# Patient Record
Sex: FEMALE | Race: WHITE
[De-identification: names, ages, dates, MRNs, and addresses within clinical notes are randomized per-mention and may not be internally consistent; named-entity substitution may affect disease eponyms.]

---

## 2019-06-27 ENCOUNTER — HOSPITAL ENCOUNTER (OUTPATIENT)
Dept: HOSPITAL 35 - HYPER | Age: 74
End: 2019-06-27
Attending: PREVENTIVE MEDICINE
Payer: COMMERCIAL

## 2019-06-27 DIAGNOSIS — I96: ICD-10-CM

## 2019-06-27 DIAGNOSIS — N18.6: ICD-10-CM

## 2019-06-27 DIAGNOSIS — L97.512: ICD-10-CM

## 2019-06-27 DIAGNOSIS — Z87.891: ICD-10-CM

## 2019-06-27 DIAGNOSIS — E11.52: ICD-10-CM
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
## 2019-06-27 DIAGNOSIS — K50.90: ICD-10-CM

## 2019-06-27 DIAGNOSIS — T87.89: Primary | ICD-10-CM

## 2019-06-27 DIAGNOSIS — Z99.2: ICD-10-CM

## 2019-06-27 DIAGNOSIS — I48.2: ICD-10-CM

## 2019-06-27 DIAGNOSIS — M86.8X8: ICD-10-CM

## 2019-06-27 DIAGNOSIS — E11.22: ICD-10-CM

## 2019-06-27 DIAGNOSIS — Z79.82: ICD-10-CM

## 2019-06-27 DIAGNOSIS — E11.69: ICD-10-CM

## 2019-06-27 DIAGNOSIS — E11.621: ICD-10-CM

## 2019-07-11 ENCOUNTER — HOSPITAL ENCOUNTER (OUTPATIENT)
Dept: HOSPITAL 35 - HYPER | Age: 74
End: 2019-07-11
Attending: PREVENTIVE MEDICINE
Payer: COMMERCIAL

## 2019-07-11 DIAGNOSIS — I48.2: ICD-10-CM
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
## 2019-07-11 DIAGNOSIS — T87.89: Primary | ICD-10-CM

## 2019-07-11 DIAGNOSIS — I12.9: ICD-10-CM

## 2019-07-11 DIAGNOSIS — I96: ICD-10-CM

## 2019-07-11 DIAGNOSIS — E11.69: ICD-10-CM

## 2019-07-11 DIAGNOSIS — E11.22: ICD-10-CM

## 2019-07-11 DIAGNOSIS — M86.8X7: ICD-10-CM

## 2019-07-11 DIAGNOSIS — N18.6: ICD-10-CM

## 2019-07-11 DIAGNOSIS — E11.52: ICD-10-CM

## 2019-07-11 DIAGNOSIS — Z87.891: ICD-10-CM

## 2019-07-11 DIAGNOSIS — Z79.82: ICD-10-CM

## 2019-07-11 DIAGNOSIS — Z99.2: ICD-10-CM

## 2019-07-11 DIAGNOSIS — E11.621: ICD-10-CM

## 2019-07-11 DIAGNOSIS — L97.512: ICD-10-CM

## 2019-07-25 ENCOUNTER — HOSPITAL ENCOUNTER (OUTPATIENT)
Dept: HOSPITAL 35 - HYPER | Age: 74
End: 2019-07-25
Attending: PREVENTIVE MEDICINE
Payer: COMMERCIAL

## 2019-07-25 DIAGNOSIS — Z95.820: ICD-10-CM
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
## 2019-07-25 DIAGNOSIS — Z87.891: ICD-10-CM

## 2019-07-25 DIAGNOSIS — T87.89: Primary | ICD-10-CM

## 2019-07-25 DIAGNOSIS — E11.69: ICD-10-CM

## 2019-07-25 DIAGNOSIS — E11.621: ICD-10-CM

## 2019-07-25 DIAGNOSIS — I96: ICD-10-CM

## 2019-07-25 DIAGNOSIS — N18.6: ICD-10-CM

## 2019-07-25 DIAGNOSIS — Z79.82: ICD-10-CM

## 2019-07-25 DIAGNOSIS — M86.8X8: ICD-10-CM

## 2019-07-25 DIAGNOSIS — I48.2: ICD-10-CM

## 2019-07-25 DIAGNOSIS — L97.512: ICD-10-CM

## 2019-07-25 DIAGNOSIS — E11.52: ICD-10-CM

## 2019-07-25 DIAGNOSIS — K50.90: ICD-10-CM

## 2019-07-25 DIAGNOSIS — E11.22: ICD-10-CM

## 2019-07-25 DIAGNOSIS — Z89.522: ICD-10-CM

## 2019-07-25 DIAGNOSIS — Z99.2: ICD-10-CM

## 2019-08-13 ENCOUNTER — HOSPITAL ENCOUNTER (OUTPATIENT)
Dept: HOSPITAL 35 - HYPER | Age: 74
End: 2019-08-13
Attending: NURSE PRACTITIONER
Payer: COMMERCIAL

## 2019-08-13 DIAGNOSIS — K50.90: ICD-10-CM

## 2019-08-13 DIAGNOSIS — M86.8X8: ICD-10-CM

## 2019-08-13 DIAGNOSIS — T87.89: Primary | ICD-10-CM
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
## 2019-08-13 DIAGNOSIS — Z79.82: ICD-10-CM

## 2019-08-13 DIAGNOSIS — Z87.891: ICD-10-CM

## 2019-08-13 DIAGNOSIS — E11.69: ICD-10-CM

## 2019-08-13 DIAGNOSIS — I48.2: ICD-10-CM

## 2019-08-13 DIAGNOSIS — Z99.2: ICD-10-CM

## 2019-08-13 DIAGNOSIS — Z89.411: ICD-10-CM

## 2019-08-13 DIAGNOSIS — I96: ICD-10-CM

## 2019-08-13 DIAGNOSIS — L97.512: ICD-10-CM

## 2019-08-13 DIAGNOSIS — Z95.820: ICD-10-CM

## 2019-08-13 DIAGNOSIS — E11.52: ICD-10-CM

## 2019-08-13 DIAGNOSIS — N18.6: ICD-10-CM

## 2019-08-13 DIAGNOSIS — E11.22: ICD-10-CM

## 2019-08-13 DIAGNOSIS — Z89.522: ICD-10-CM

## 2019-08-20 ENCOUNTER — HOSPITAL ENCOUNTER (OUTPATIENT)
Dept: HOSPITAL 35 - HYPER | Age: 74
End: 2019-08-20
Attending: PREVENTIVE MEDICINE
Payer: COMMERCIAL

## 2019-08-20 DIAGNOSIS — N18.6: ICD-10-CM

## 2019-08-20 DIAGNOSIS — L97.512: ICD-10-CM

## 2019-08-20 DIAGNOSIS — Z87.891: ICD-10-CM

## 2019-08-20 DIAGNOSIS — M86.8X7: ICD-10-CM
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
## 2019-08-20 DIAGNOSIS — Z79.82: ICD-10-CM

## 2019-08-20 DIAGNOSIS — E11.621: ICD-10-CM

## 2019-08-20 DIAGNOSIS — Z99.2: ICD-10-CM

## 2019-08-20 DIAGNOSIS — E11.52: ICD-10-CM

## 2019-08-20 DIAGNOSIS — T81.89XD: Primary | ICD-10-CM

## 2019-08-20 DIAGNOSIS — I96: ICD-10-CM

## 2019-08-20 DIAGNOSIS — E11.22: ICD-10-CM

## 2019-08-20 DIAGNOSIS — I48.2: ICD-10-CM

## 2019-08-20 DIAGNOSIS — E11.69: ICD-10-CM

## 2019-09-03 ENCOUNTER — HOSPITAL ENCOUNTER (INPATIENT)
Dept: HOSPITAL 35 - HYPER | Age: 74
LOS: 10 days | Discharge: NURSING, REHAB OR LONG-TERM CARE | DRG: 853 | End: 2019-09-13
Attending: INTERNAL MEDICINE | Admitting: HOSPITALIST
Payer: COMMERCIAL

## 2019-09-03 VITALS — DIASTOLIC BLOOD PRESSURE: 50 MMHG | SYSTOLIC BLOOD PRESSURE: 95 MMHG

## 2019-09-03 VITALS — DIASTOLIC BLOOD PRESSURE: 48 MMHG | SYSTOLIC BLOOD PRESSURE: 86 MMHG

## 2019-09-03 VITALS — WEIGHT: 108.9 LBS | BODY MASS INDEX: 20.04 KG/M2 | HEIGHT: 62 IN

## 2019-09-03 DIAGNOSIS — Z80.42: ICD-10-CM

## 2019-09-03 DIAGNOSIS — M86.8X8: ICD-10-CM

## 2019-09-03 DIAGNOSIS — Z66: ICD-10-CM

## 2019-09-03 DIAGNOSIS — A41.9: Primary | ICD-10-CM

## 2019-09-03 DIAGNOSIS — L02.611: ICD-10-CM

## 2019-09-03 DIAGNOSIS — R65.21: ICD-10-CM

## 2019-09-03 DIAGNOSIS — Z90.49: ICD-10-CM

## 2019-09-03 DIAGNOSIS — I73.9: ICD-10-CM

## 2019-09-03 DIAGNOSIS — N18.6: ICD-10-CM

## 2019-09-03 DIAGNOSIS — Z93.3: ICD-10-CM

## 2019-09-03 DIAGNOSIS — I95.9: ICD-10-CM

## 2019-09-03 DIAGNOSIS — Z77.22: ICD-10-CM

## 2019-09-03 DIAGNOSIS — L03.115: ICD-10-CM

## 2019-09-03 DIAGNOSIS — K50.90: ICD-10-CM

## 2019-09-03 DIAGNOSIS — Z79.01: ICD-10-CM

## 2019-09-03 DIAGNOSIS — I48.91: ICD-10-CM

## 2019-09-03 DIAGNOSIS — Z87.891: ICD-10-CM

## 2019-09-03 DIAGNOSIS — E44.0: ICD-10-CM

## 2019-09-03 DIAGNOSIS — Z79.1: ICD-10-CM

## 2019-09-03 DIAGNOSIS — D63.1: ICD-10-CM

## 2019-09-03 DIAGNOSIS — Z89.512: ICD-10-CM

## 2019-09-03 DIAGNOSIS — R00.0: ICD-10-CM

## 2019-09-03 DIAGNOSIS — Z88.6: ICD-10-CM

## 2019-09-03 LAB
ALBUMIN SERPL-MCNC: 2.5 G/DL (ref 3.4–5)
ALT SERPL-CCNC: 19 U/L (ref 30–65)
ANION GAP SERPL CALC-SCNC: 12 MMOL/L (ref 7–16)
AST SERPL-CCNC: 26 U/L (ref 15–37)
BASOPHILS NFR BLD AUTO: 0.6 % (ref 0–2)
BILIRUB SERPL-MCNC: 0.3 MG/DL
BUN SERPL-MCNC: 42 MG/DL (ref 7–18)
CALCIUM SERPL-MCNC: 10.3 MG/DL (ref 8.5–10.1)
CHLORIDE SERPL-SCNC: 100 MMOL/L (ref 98–107)
CO2 SERPL-SCNC: 26 MMOL/L (ref 21–32)
CREAT SERPL-MCNC: 5.1 MG/DL (ref 0.6–1)
EOSINOPHIL NFR BLD: 1.2 % (ref 0–3)
ERYTHROCYTE [DISTWIDTH] IN BLOOD BY AUTOMATED COUNT: 19.5 % (ref 10.5–14.5)
GLUCOSE SERPL-MCNC: 170 MG/DL (ref 74–106)
GRANULOCYTES NFR BLD MANUAL: 76.6 % (ref 36–66)
HCT VFR BLD CALC: 31 % (ref 37–47)
HGB BLD-MCNC: 9.7 GM/DL (ref 12–15)
LYMPHOCYTES NFR BLD AUTO: 13.2 % (ref 24–44)
MCH RBC QN AUTO: 25 PG (ref 26–34)
MCHC RBC AUTO-ENTMCNC: 31.3 G/DL (ref 28–37)
MCV RBC: 79.9 FL (ref 80–100)
MONOCYTES NFR BLD: 8.4 % (ref 1–8)
NEUTROPHILS # BLD: 8.6 THOU/UL (ref 1.4–8.2)
PLATELET # BLD: 351 THOU/UL (ref 150–400)
POTASSIUM SERPL-SCNC: 3.7 MMOL/L (ref 3.5–5.1)
PROT SERPL-MCNC: 7.4 G/DL (ref 6.4–8.2)
RBC # BLD AUTO: 3.88 MIL/UL (ref 4.2–5)
SODIUM SERPL-SCNC: 138 MMOL/L (ref 136–145)
WBC # BLD AUTO: 11.3 THOU/UL (ref 4–11)

## 2019-09-03 PROCEDURE — 10047: CPT

## 2019-09-03 PROCEDURE — 10081 I&D PILONIDAL CYST COMP: CPT

## 2019-09-03 PROCEDURE — 62110: CPT

## 2019-09-03 PROCEDURE — 62900: CPT

## 2019-09-03 PROCEDURE — 50101: CPT

## 2019-09-03 PROCEDURE — 57178: CPT

## 2019-09-03 PROCEDURE — 50010 RENAL EXPLORATION: CPT

## 2019-09-03 PROCEDURE — 10078: CPT

## 2019-09-03 PROCEDURE — 50951 ENDOSCOPY OF URETER: CPT

## 2019-09-03 PROCEDURE — 32100 EXPLORATION OF CHEST: CPT

## 2019-09-03 PROCEDURE — 50386 REMOVE STENT VIA TRANSURETH: CPT

## 2019-09-03 PROCEDURE — 70005: CPT

## 2019-09-03 PROCEDURE — 57091: CPT

## 2019-09-03 PROCEDURE — 56527: CPT

## 2019-09-03 PROCEDURE — 10204: CPT

## 2019-09-03 PROCEDURE — 10203: CPT

## 2019-09-03 NOTE — NUR
PATIENT ALERT AND ORIENTED AND DIRECT ADMIT WITH RIGHT LE CELLULITIS FROM DR. PACHECO OFFICE. EXPERIENCING 10/10 AND CRYING FORM RIGHT LE. , DELON
AT BEDSIDE UPON ADMISSION AND LIVES AT HOME WITH . USES W/C FROM
TRANSPORTATION. LEFT BKA 2 YEARS AGO. DIALYSIS MWF AND RIGHT CHEST DIALYSIS
CATHETER. REGULAR DIET.

## 2019-09-04 VITALS — DIASTOLIC BLOOD PRESSURE: 53 MMHG | SYSTOLIC BLOOD PRESSURE: 91 MMHG

## 2019-09-04 VITALS — DIASTOLIC BLOOD PRESSURE: 62 MMHG | SYSTOLIC BLOOD PRESSURE: 106 MMHG

## 2019-09-04 VITALS — SYSTOLIC BLOOD PRESSURE: 103 MMHG | DIASTOLIC BLOOD PRESSURE: 50 MMHG

## 2019-09-04 VITALS — SYSTOLIC BLOOD PRESSURE: 107 MMHG | DIASTOLIC BLOOD PRESSURE: 53 MMHG

## 2019-09-04 VITALS — DIASTOLIC BLOOD PRESSURE: 46 MMHG | SYSTOLIC BLOOD PRESSURE: 95 MMHG

## 2019-09-04 LAB
ABSOLUTE RETIC COUNT: 0.11 10^6/UL
ALBUMIN SERPL-MCNC: 2.2 G/DL (ref 3.4–5)
ANION GAP SERPL CALC-SCNC: 15 MMOL/L (ref 7–16)
ANISOCYTOSIS BLD QL SMEAR: (no result)
BASOPHILS NFR BLD AUTO: 0 % (ref 0–2)
BUN SERPL-MCNC: 44 MG/DL (ref 7–18)
CALCIUM SERPL-MCNC: 9.6 MG/DL (ref 8.5–10.1)
CHLORIDE SERPL-SCNC: 101 MMOL/L (ref 98–107)
CO2 SERPL-SCNC: 20 MMOL/L (ref 21–32)
CREAT SERPL-MCNC: 5.6 MG/DL (ref 0.6–1)
EOSINOPHIL NFR BLD: 2 % (ref 0–3)
ERYTHROCYTE [DISTWIDTH] IN BLOOD BY AUTOMATED COUNT: 19.3 % (ref 10.5–14.5)
FERRITIN SERPL-MCNC: 1330 NG/ML (ref 8–252)
GLUCOSE SERPL-MCNC: 93 MG/DL (ref 74–106)
GRANULOCYTES NFR BLD MANUAL: 76 % (ref 36–66)
HCT VFR BLD CALC: 28.1 % (ref 37–47)
HGB BLD-MCNC: 8.9 GM/DL (ref 12–15)
INR PPP: 1.1
IRON SERPL-MCNC: 30 UG/DL (ref 50–170)
LYMPHOCYTES NFR BLD AUTO: 8 % (ref 24–44)
MCH RBC QN AUTO: 25.4 PG (ref 26–34)
MCHC RBC AUTO-ENTMCNC: 31.7 G/DL (ref 28–37)
MCV RBC: 80 FL (ref 80–100)
MONOCYTES NFR BLD: 13 % (ref 1–8)
NEUTROPHILS # BLD: 7.9 THOU/UL (ref 1.4–8.2)
NEUTS BAND NFR BLD: 1 % (ref 0–8)
OBSERVED RETIC COUNT: 2.6 % (ref 0.6–2.6)
OVALOCYTES BLD QL SMEAR: (no result)
PHOSPHATE SERPL-MCNC: 6.2 MG/DL (ref 2.5–4.9)
PLATELET # BLD: 294 THOU/UL (ref 150–400)
POLYCHROMASIA BLD QL SMEAR: (no result)
POTASSIUM SERPL-SCNC: 4 MMOL/L (ref 3.5–5.1)
PROTHROMBIN TIME: 11 SECONDS (ref 9.3–11.4)
RBC # BLD AUTO: 3.52 MIL/UL (ref 4.2–5)
SAO2 % BLD FROM PO2: 23 % (ref 20–39)
SODIUM SERPL-SCNC: 136 MMOL/L (ref 136–145)
TEARDROPS: (no result)
TIBC SERPL-MCNC: 129 UG/DL (ref 250–450)
VIT B12 SERPL-MCNC: 4053 PG/ML (ref 193–986)
WBC # BLD AUTO: 10.2 THOU/UL (ref 4–11)

## 2019-09-04 PROCEDURE — 5A1D70Z PERFORMANCE OF URINARY FILTRATION, INTERMITTENT, LESS THAN 6 HOURS PER DAY: ICD-10-PCS | Performed by: HOSPITALIST

## 2019-09-04 NOTE — EKG
[address]  [address]
Phone:  [phone]                    ELECTROCARDIOGRAM REPORT      
_______________________________________________________________________________
 
Name:       GAVINO NGUYEN                   Room #:         455-P       ADM IN  
M.R.#:      [id number]     Account #:      [id number]  
Admission:  19    Attend Phys:    Ambrose Cha MD      
Discharge:              Date of Birth:  [birth year]  
                                                          Report #: [id number]
   [id number]
_______________________________________________________________________________
THIS REPORT FOR:   //name//                          
 
                          [facility]
                                       
Test Date:    2019               Test Time:    08:49:21
Pat Name:     GAVINO NGUYEN              Department:   
Patient ID:   [id number]            Room:         455 
Gender:       F                        Technician:   SHERYL
:          1945               Requested By: Leatha Ireland
Order Number: [id number] MD:   Polo Montez
                                 Measurements
Intervals                              Axis          
Rate:         105                      P:            15
AK:           168                      QRS:          34
QRSD:         88                       T:            13
QT:           347                                    
QTc:          459                                    
                           Interpretive Statements
Sinus tachycardia
Low voltage, precordial leads
Borderline T abnormalities, inferior leads
No previous ECG available for comparison
 
Electronically Signed On 2019 13:20:45 CDT by Polo Montez
[email or web address]
 
 
 
 
 
 
 
 
 
 
 
 
 
 
 
 
 
 
  <ELECTRONICALLY SIGNED>
   By: Polo Montez MD        
  19     1320
D: 19 0849                           _____________________________________
T: 1951                           Polo Montez MD          /BRYAN

## 2019-09-04 NOTE — NUR
Patient has been complaining of "level ten" unrelieved right foot pain.
Received new orders from Dr Castellanos for Morphine 2mg/0.5mL IV push. Patient has
expressed relief from this new order. Dialysis to be done this evening, as
ordered. Patient has had a moderate amount of brown vaginal discharge. Blood
pressure continues to run low which is "normal" for this patient. LSCTA
(diminished), abdomen is round, BS active, no new open areas on skin. Will
continue to monitor.

## 2019-09-04 NOTE — NUR
ASSUMED CARE OF PT AT 1900HRS. PT AOX4 AND CALLS FOR ASSISTANCE AS NEEDED.
PT IS ON DIALYSIS M, W & F. ACCESS ON RIGHT CHEST. LEFT BKA COMPLETELY HEALED.
COLOSTOMY BAG ON LEFT QUADRANT. PT COMPLAINED OF PAIN ENTIRE SHIFT WITH
MINIMAL RELIEF FROM PRN PAIN MEDS. VSS AND WILL CONTINUE TO MONITOR.

## 2019-09-05 VITALS — DIASTOLIC BLOOD PRESSURE: 53 MMHG | SYSTOLIC BLOOD PRESSURE: 93 MMHG

## 2019-09-05 VITALS — DIASTOLIC BLOOD PRESSURE: 52 MMHG | SYSTOLIC BLOOD PRESSURE: 101 MMHG

## 2019-09-05 VITALS — SYSTOLIC BLOOD PRESSURE: 136 MMHG | DIASTOLIC BLOOD PRESSURE: 56 MMHG

## 2019-09-05 VITALS — SYSTOLIC BLOOD PRESSURE: 116 MMHG | DIASTOLIC BLOOD PRESSURE: 46 MMHG

## 2019-09-05 PROCEDURE — B4181ZZ FLUOROSCOPY OF BILATERAL RENAL ARTERIES USING LOW OSMOLAR CONTRAST: ICD-10-PCS | Performed by: RADIOLOGY

## 2019-09-05 PROCEDURE — B41D1ZZ FLUOROSCOPY OF AORTA AND BILATERAL LOWER EXTREMITY ARTERIES USING LOW OSMOLAR CONTRAST: ICD-10-PCS | Performed by: RADIOLOGY

## 2019-09-05 NOTE — NUR
ASSUMED CARE OF PATIENT AT 0715, PATIENT ALERT AND ORIENTED X 4. PATIENT
BEDREST. PATIENT HAS RIGHT FOOT WOUND, DRESSING C/D/I. PATIENT C/O PAIN WITH
RIGHT FOOT, RECEIVED MORPHINE 2MG IV AND OXYCODONE 1 TABLET WITH PARTIAL
RELIEF. PATIENT HAD LIGHT BREAKFAST, AND NPO FOR LUNCH FOR ARTERIOGRAM THIS
AFTERNOON, PATIENT LEFT FLOOR AT 1500, VIA BED. PATIENT ARRIVED BACK ON THE
UNIT AT ABOUT 1830, REPORT FROM JOSE/RN. PATIENT ON BEDREST UNTIL 2140.
PATIENT HAS DRESSING TO LEFT GROIN, NO OCCULSION FOUND. PATIENT CONTINUES WITH
DISCHARGE FROM VAGINAL AREA, BROWNISH COLOR WITH FOUL ODOR, THIS RN NOTIFIED
DR SORIA, RECEIVED ORDER FOR LOTRIMIN CREAM BID VAGINAL, ORDER FOR PELVIC
ULTRASOUND WILL DONE IN AM. WHEN PATIENT ARRIVED BACK ON THE UNIT, NO C/O
PAIN, SHE RECEIVED FENTANYL 25 MCG IV IN PROCEDURE. WILL CONTINUE TO MONITOR.

## 2019-09-05 NOTE — NUR
ASSUMED CARE AROUND 1900. AXOX3. MED REC COMPLETED. PT DID NOT HAVE A LIST.
LIST PROBABLY NOT COMPLETE. DONE AS MUCH AS PT REMEMBERS. DIALYSIS COMPLETED.
BP NOTED LOW. CALLED NP GLENDA ON CALL FOR HIMS. NS STOPPED AND MIDODRINE
RESTARTED. NO S/S ACUTE DISTRESS NOTED OR REPORTED AT THIS TIME. WILL CONT TO
MONITOR ANY CHANGES IN CONDITION.

## 2019-09-05 NOTE — HC
[facility]
[address]
[place]   [address]                     CONSULTATION                  
_______________________________________________________________________________
 
Name:       GAVINO NGUYEN                   Room #:         455-P       ADM IN  
M.R.#:      [id number]                       Account #:      [id number]  
Admission:  09/03/19    Attend Phys:    Ambrose Cha MD      
Discharge:              Date of Birth:  [birth year]  
                                                          Report #: [id number]
                                                                    [id number]   
_______________________________________________________________________________
THIS REPORT FOR:   //name//                          
 
CC: James Read
 
DATE OF SERVICE:  09/04/2019
 
 
CHIEF COMPLAINT:  Ulceration.
 
HISTORY OF PRESENT ILLNESS:  This is a 74-year-old female patient with a history
of end-stage renal disease and peripheral vascular disease and prior left below
knee amputation, who was admitted from the wound care clinic yesterday with
increasing pain and redness involving her right foot, in particular the right
first MTP or first metatarsal.  She has had a prior amputation of the right toe.
 The patient notes continued and worsening pain and is now admitted for further
evaluation and treatment.
 
PAST MEDICAL HISTORY:  Positive for history of end-stage renal disease,
requiring hemodialysis, history of Crohn's disease, peripheral vascular disease,
status post previous vascular bypass.
 
ALLERGIES:  CODEINE.
 
SOCIAL HISTORY:  The patient smoked 1 pack per day, having quit 5 years ago.  No
alcohol intake.
 
FAMILY HISTORY:  Positive for prostate cancer in her father.
 
MEDICATIONS:  Fludrocortisone, morphine, oxycodone, polyethylene glycol, and
vancomycin.
 
ALLERGIES:  CODEINE.
 
REVIEW OF SYSTEMS:
CONSTITUTIONAL:  The patient denies fever, chills, or weight loss.
NEUROLOGICAL:  The patient denies focal weakness.
ENT:  The patient denies earache, nasal drainage, sore throat.
CARDIOVASCULAR:  The patient denies chest pain, palpitations or diaphoresis.
PULMONARY:  The patient denies cough or shortness of breath.
GASTROINTESTINAL:  The patient denies nausea, vomiting, or diarrhea.
ORTHOPEDIC:  The patient has a prior left below-knee amputation and has
significant pain involving her right foot and leg.
 
 
 
 
[facility]
[address] [place]   [address]                     CONSULTATION                  
_______________________________________________________________________________
 
Name:       GAVINO NGUYEN                   Room #:         455-P       ADM IN  
[facility].#:      [id number]                       Account #:      [id number]  
Admission:  09/03/19    Attend Phys:    Ambrose Cha MD      
Discharge:              Date of Birth:  [birth year]  
                                                          Report #: [id number]
                                                                    [id number]   
_______________________________________________________________________________
Other systems in a 14-point review of systems are negative.
 
PHYSICAL EXAMINATION:
VITAL SIGNS:  At this time Include temperature 36.3, pulse 41, respirations 13,
blood pressure 95/46.
GENERAL:  This is a chronically ill-appearing female patient who appears to be
in moderate discomfort.
HEENT:  Head is normocephalic.  Nose and throat clear.
NECK:  Supple.
LUNGS:  Clear.
ABDOMEN:  Soft.  Bowel sounds present.  She has a colostomy in place.  It
appears to be functioning.
EXTREMITIES:  Lower extremity demonstrates healed prior left below knee
amputation.  She has a surgically absent right great toe.  The amputation site
is not entirely healed.  Some of the tissue is contracted.  It is very tender. 
She also has ulceration on the plantar aspect of the foot as well as the right
second toe.  Distal pulses are not palpable.
NEUROLOGIC:  The patient is alert and does move all 4 extremities spontaneously.
 
LABORATORY DATA:  Includes white blood cell count 10.2 with hemoglobin of 8.9,
hematocrit 28.1.  Sodium 136, potassium 4.0, chloride 101, CO2 20, BUN 44,
creatinine 5.6, glucose 93, calcium is 9.6, phosphorus is 6.2.  CRP is markedly
elevated at 243.5, albumin is 2.2.  MRSA PCR is negative.  INR is 1.1.
 
CLINICAL IMPRESSION:
1.  Nonhealing surgical wound to the right foot following amputation of the
right great toe.  One would consider the possibility of underlying bone
infection, i.e., osteomyelitis.
2.  Severe peripheral vascular disease.  Doppler studies on this admission
demonstrate monophasic waveforms from the common femoral artery through
superficial femoral artery, likely indicating aortic or iliac stenosis,
occlusion from the popliteal artery throughout the runoff vessels in the calf
with reconstitution of the dorsalis pedis in the foot, bypass graft in the
thighs occluded.  Recommendations for additional angiography were made by the
radiologist reading the study.
 
RECOMMENDATIONS:  At this point in time, we will recommend Xeroform gauze
dressing and a dry gauze dressing to the amputation site of the right foot.  We
will consult Interventional Radiology, also obtain plain film x-rays, and MRI of
the right foot to evaluate for underlying bone infection.  With her severe
peripheral vascular disease, I think she would be at high risk for any surgical
 
 
 
[address]   [address]                     CONSULTATION                  
_______________________________________________________________________________
 
Name:       GAVINO NGUYEN                   Room #:         455-P       ADM IN  
M.R.#:      [id number]                       Account #:      [id number]  
Admission:  09/03/19    Attend Phys:    Ambrose Cha MD      
Discharge:              Date of Birth:  [birth year]  
                                                          Report #: [id number]
                                                                    [id number]   
_______________________________________________________________________________
intervention on the foot.  It may be possible that she would be amenable to an
endovascular procedure.  I appreciate being asked to see her in consultation.
 
 
 
 
 
 
 
 
 
 
 
 
 
 
 
 
 
 
 
 
 
 
 
 
 
 
 
 
 
 
 
 
 
 
 
 
 
 
 
 
 
 
  <ELECTRONICALLY SIGNED>
   By: Jame Gunderson MD        
  09/05/19     1140
D: 09/04/19 1526                           _____________________________________
T: 09/05/19 0208                           Jame Gunderson MD          /nt

## 2019-09-06 VITALS — DIASTOLIC BLOOD PRESSURE: 56 MMHG | SYSTOLIC BLOOD PRESSURE: 99 MMHG

## 2019-09-06 VITALS — DIASTOLIC BLOOD PRESSURE: 59 MMHG | SYSTOLIC BLOOD PRESSURE: 99 MMHG

## 2019-09-06 VITALS — DIASTOLIC BLOOD PRESSURE: 50 MMHG | SYSTOLIC BLOOD PRESSURE: 92 MMHG

## 2019-09-06 VITALS — DIASTOLIC BLOOD PRESSURE: 74 MMHG | SYSTOLIC BLOOD PRESSURE: 103 MMHG

## 2019-09-06 VITALS — SYSTOLIC BLOOD PRESSURE: 94 MMHG | DIASTOLIC BLOOD PRESSURE: 48 MMHG

## 2019-09-06 LAB
ANION GAP SERPL CALC-SCNC: 14 MMOL/L (ref 7–16)
BUN SERPL-MCNC: 24 MG/DL (ref 7–18)
CALCIUM SERPL-MCNC: 9.9 MG/DL (ref 8.5–10.1)
CHLORIDE SERPL-SCNC: 101 MMOL/L (ref 98–107)
CO2 SERPL-SCNC: 20 MMOL/L (ref 21–32)
CREAT SERPL-MCNC: 4.3 MG/DL (ref 0.6–1)
ERYTHROCYTE [DISTWIDTH] IN BLOOD BY AUTOMATED COUNT: 18.6 % (ref 10.5–14.5)
GLUCOSE SERPL-MCNC: 70 MG/DL (ref 74–106)
HCT VFR BLD CALC: 28.5 % (ref 37–47)
HGB BLD-MCNC: 8.7 GM/DL (ref 12–15)
MAGNESIUM SERPL-MCNC: 1.5 MG/DL (ref 1.8–2.4)
MCH RBC QN AUTO: 25 PG (ref 26–34)
MCHC RBC AUTO-ENTMCNC: 30.5 G/DL (ref 28–37)
MCV RBC: 82 FL (ref 80–100)
PHOSPHATE SERPL-MCNC: 6.8 MG/DL (ref 2.5–4.9)
PLATELET # BLD: 366 THOU/UL (ref 150–400)
POTASSIUM SERPL-SCNC: 4.3 MMOL/L (ref 3.5–5.1)
RBC # BLD AUTO: 3.48 MIL/UL (ref 4.2–5)
SODIUM SERPL-SCNC: 135 MMOL/L (ref 136–145)
WBC # BLD AUTO: 13.1 THOU/UL (ref 4–11)

## 2019-09-06 NOTE — NUR
ASSESSMENTS COMPLETED. PT A&OX4. C/O OF SEVERE UNCONTROLLED PAIN TO LEFT RIGHT
FOOT. PT STILL HAVING MODERATE BROWN FOWL ODOR VAGINAL DISCHARGE. LOTRIMIN BID
WAS STARTED . PT HAS A COLOSTOMY AND IS PASSING A LOT OF FLATUS. PT IS TOTALLY
DEPENDENT ON STAFF FOR ADL'S. PT IS GOING FOR DIALYSIS THIS MORNING (MWF).

## 2019-09-06 NOTE — NUR
PATIENT ALERT AND ORIENTED AND IN BED ALL DAY EXCEPT FOR US PROCEDURE. PATIENT
SEEMS TO REST WELL AFTER IV AND PO MEDS GIVEN. PATIENT IS NOT EATING MUCH AND
DIETARY NOTIFIED AND CAME TO DISCUSS DIET WITH PATIENT. WOUND DRESSING
CHANGED. SPOKE WITH . NO VISITORS TODAY.

## 2019-09-06 NOTE — NUR
CARE TEAM INDICATED WE ARE AWAITING POD CONSULT. IT IS LIKELY THAT PT WILL
REMAIN HERE OVER THE WEEKEND. CM TO FOLLOW AS INDICATED WITH DC PLANNING.

## 2019-09-07 VITALS — SYSTOLIC BLOOD PRESSURE: 94 MMHG | DIASTOLIC BLOOD PRESSURE: 53 MMHG

## 2019-09-07 VITALS — DIASTOLIC BLOOD PRESSURE: 51 MMHG | SYSTOLIC BLOOD PRESSURE: 92 MMHG

## 2019-09-07 VITALS — SYSTOLIC BLOOD PRESSURE: 84 MMHG | DIASTOLIC BLOOD PRESSURE: 53 MMHG

## 2019-09-07 VITALS — DIASTOLIC BLOOD PRESSURE: 60 MMHG | SYSTOLIC BLOOD PRESSURE: 99 MMHG

## 2019-09-07 LAB
ANION GAP SERPL CALC-SCNC: 13 MMOL/L (ref 7–16)
BUN SERPL-MCNC: 16 MG/DL (ref 7–18)
CALCIUM SERPL-MCNC: 9.9 MG/DL (ref 8.5–10.1)
CHLORIDE SERPL-SCNC: 100 MMOL/L (ref 98–107)
CO2 SERPL-SCNC: 23 MMOL/L (ref 21–32)
CREAT SERPL-MCNC: 3.5 MG/DL (ref 0.6–1)
ERYTHROCYTE [DISTWIDTH] IN BLOOD BY AUTOMATED COUNT: 18.4 % (ref 10.5–14.5)
GLUCOSE SERPL-MCNC: 65 MG/DL (ref 74–106)
HCT VFR BLD CALC: 26.6 % (ref 37–47)
HGB BLD-MCNC: 8.2 GM/DL (ref 12–15)
MAGNESIUM SERPL-MCNC: 2.4 MG/DL (ref 1.8–2.4)
MCH RBC QN AUTO: 25 PG (ref 26–34)
MCHC RBC AUTO-ENTMCNC: 30.8 G/DL (ref 28–37)
MCV RBC: 81.3 FL (ref 80–100)
PHOSPHATE SERPL-MCNC: 5.6 MG/DL (ref 2.5–4.9)
PLATELET # BLD: 327 THOU/UL (ref 150–400)
POTASSIUM SERPL-SCNC: 3.8 MMOL/L (ref 3.5–5.1)
RBC # BLD AUTO: 3.27 MIL/UL (ref 4.2–5)
SODIUM SERPL-SCNC: 136 MMOL/L (ref 136–145)
WBC # BLD AUTO: 10.9 THOU/UL (ref 4–11)

## 2019-09-07 NOTE — NUR
ASSUMED CARE OF PT @1900. PT A&OX4. PT APPEARS MORE COMFORTABLE AFTER PAIN MED
WAS ADMINISTERED. RIGHT FOOT DRESSING, INTACT WITH MINOR DRAINAGE. PT HAD
DIALYSIS YESTEDAY AND 1L OF FLUID WAS TAKEN OUT. PT STILL HAS VERY POOR
APPETITE AND DECLINED ANY SUPPLEMENTS. V/S ARE STABLE. WILL CONT TO MONITOR

## 2019-09-07 NOTE — NUR
Received awake on bed. Due medications given as prescribed, able to swallow
tablets w/o difficulty. Complained of pain, due PRN pain medications given as
prescribed. On O2 at 2lpm via nasal cannula. With colostomy in place- output
measured and recorded accordingly. Pt encouraged and assisted in eating and
drinking. With dialysis external port at R chest- transparent dressing in
place- pt's dialsis schedule every M-W-F, last dialysis was yesterday 9/6 as
per night shift nurse's report. With L BKA. With wound at R foot- dressing in
place, changed by night staff nurse at 5am C/D/I. Pt turned regularly. With SL
at R FA. With dressing at L groin- C/D/I.
Pt seen by Dr Simeon this AM- wound assessed, pt not needing surgery, dressing
changed by physician.

## 2019-09-08 VITALS — DIASTOLIC BLOOD PRESSURE: 62 MMHG | SYSTOLIC BLOOD PRESSURE: 90 MMHG

## 2019-09-08 VITALS — SYSTOLIC BLOOD PRESSURE: 106 MMHG | DIASTOLIC BLOOD PRESSURE: 56 MMHG

## 2019-09-08 VITALS — SYSTOLIC BLOOD PRESSURE: 87 MMHG | DIASTOLIC BLOOD PRESSURE: 50 MMHG

## 2019-09-08 VITALS — SYSTOLIC BLOOD PRESSURE: 92 MMHG | DIASTOLIC BLOOD PRESSURE: 62 MMHG

## 2019-09-08 VITALS — SYSTOLIC BLOOD PRESSURE: 86 MMHG | DIASTOLIC BLOOD PRESSURE: 50 MMHG

## 2019-09-08 VITALS — DIASTOLIC BLOOD PRESSURE: 45 MMHG | SYSTOLIC BLOOD PRESSURE: 74 MMHG

## 2019-09-08 VITALS — SYSTOLIC BLOOD PRESSURE: 93 MMHG | DIASTOLIC BLOOD PRESSURE: 60 MMHG

## 2019-09-08 VITALS — SYSTOLIC BLOOD PRESSURE: 90 MMHG | DIASTOLIC BLOOD PRESSURE: 42 MMHG

## 2019-09-08 LAB
ALBUMIN SERPL-MCNC: 2.1 G/DL (ref 3.4–5)
ALT SERPL-CCNC: 20 U/L (ref 30–65)
ANION GAP SERPL CALC-SCNC: 13 MMOL/L (ref 7–16)
ANION GAP SERPL CALC-SCNC: 13 MMOL/L (ref 7–16)
ANISOCYTOSIS BLD QL SMEAR: (no result)
AST SERPL-CCNC: 22 U/L (ref 15–37)
BASOPHILS NFR BLD AUTO: 2 % (ref 0–2)
BE(VIVO): -4.2 MMOL/L
BE(VIVO): -4.5 MMOL/L
BILIRUB SERPL-MCNC: 0.3 MG/DL
BUN SERPL-MCNC: 24 MG/DL (ref 7–18)
BUN SERPL-MCNC: 28 MG/DL (ref 7–18)
CALCIUM SERPL-MCNC: 9.3 MG/DL (ref 8.5–10.1)
CALCIUM SERPL-MCNC: 9.8 MG/DL (ref 8.5–10.1)
CHLORIDE SERPL-SCNC: 98 MMOL/L (ref 98–107)
CHLORIDE SERPL-SCNC: 98 MMOL/L (ref 98–107)
CO2 SERPL-SCNC: 23 MMOL/L (ref 21–32)
CO2 SERPL-SCNC: 25 MMOL/L (ref 21–32)
CREAT SERPL-MCNC: 5.2 MG/DL (ref 0.6–1)
CREAT SERPL-MCNC: 6 MG/DL (ref 0.6–1)
ERYTHROCYTE [DISTWIDTH] IN BLOOD BY AUTOMATED COUNT: 17.8 % (ref 10.5–14.5)
ERYTHROCYTE [DISTWIDTH] IN BLOOD BY AUTOMATED COUNT: 18.3 % (ref 10.5–14.5)
GLUCOSE SERPL-MCNC: 151 MG/DL (ref 74–106)
GLUCOSE SERPL-MCNC: 91 MG/DL (ref 74–106)
GRANULOCYTES NFR BLD MANUAL: 79 % (ref 36–66)
HCO3 BLD-SCNC: 21.5 MMOL/L (ref 22–26)
HCO3 BLD-SCNC: 21.8 MMOL/L (ref 22–26)
HCT VFR BLD CALC: 27.6 % (ref 37–47)
HCT VFR BLD CALC: 29.6 % (ref 37–47)
HGB BLD-MCNC: 8.4 GM/DL (ref 12–15)
HGB BLD-MCNC: 9 GM/DL (ref 12–15)
LYMPHOCYTES NFR BLD AUTO: 12 % (ref 24–44)
MAGNESIUM SERPL-MCNC: 2.4 MG/DL (ref 1.8–2.4)
MAGNESIUM SERPL-MCNC: 2.4 MG/DL (ref 1.8–2.4)
MCH RBC QN AUTO: 24.6 PG (ref 26–34)
MCH RBC QN AUTO: 24.9 PG (ref 26–34)
MCHC RBC AUTO-ENTMCNC: 30.3 G/DL (ref 28–37)
MCHC RBC AUTO-ENTMCNC: 30.5 G/DL (ref 28–37)
MCV RBC: 81.1 FL (ref 80–100)
MCV RBC: 81.6 FL (ref 80–100)
MONOCYTES NFR BLD: 7 % (ref 1–8)
NEUTROPHILS # BLD: 8.1 THOU/UL (ref 1.4–8.2)
PCO2 BLD: 43.3 MMHG (ref 35–45)
PCO2 BLD: 43.9 MMHG (ref 35–45)
PHOSPHATE SERPL-MCNC: 6.8 MG/DL (ref 2.5–4.9)
PLATELET # BLD: 375 THOU/UL (ref 150–400)
PLATELET # BLD: 377 THOU/UL (ref 150–400)
PO2 BLD: 55.6 MMHG (ref 80–100)
PO2 BLD: 56.7 MMHG (ref 80–100)
POTASSIUM SERPL-SCNC: 3.7 MMOL/L (ref 3.5–5.1)
POTASSIUM SERPL-SCNC: 3.8 MMOL/L (ref 3.5–5.1)
PROT SERPL-MCNC: 6.8 G/DL (ref 6.4–8.2)
RBC # BLD AUTO: 3.4 MIL/UL (ref 4.2–5)
RBC # BLD AUTO: 3.62 MIL/UL (ref 4.2–5)
SODIUM SERPL-SCNC: 134 MMOL/L (ref 136–145)
SODIUM SERPL-SCNC: 136 MMOL/L (ref 136–145)
WBC # BLD AUTO: 10 THOU/UL (ref 4–11)
WBC # BLD AUTO: 10.2 THOU/UL (ref 4–11)

## 2019-09-08 NOTE — NUR
ASSESSMENTS COMPLETED. PT C/O OF RIGHT FOOT PAIN AND WAS MEDICATED AS ORDERED,
WITH SOME RELIEF. DREESINGS CLEAN, DRY AND INTACT. PT HAD MODERATE BROWN
VAGINAL DISCHARGE. PT IS STILL ON THE LOTRIMIN CREAM BID. COLOSTOMY IS PATENT
WITH MODERATE OUTPUT. PT IS ANURIC. VITALS STABLE. WILL CONT TO MONITOR

## 2019-09-08 NOTE — NUR
Assumed pt care this am, pt is scheduled for dialysis tomorrow. Wound care
done, cultures sent to lab. Supplements were encouraged and were consumed ,
though meals were a full assists. Medication administered for her vaginal
discharges, minimal discharge has been noted. Pt has a fever this am, managed
with medication. Pt was lethargic in the afternoon, q2 turns and pericare
done. Colostomy intact and care done.
 
BP dropped to 74/45, pt was lethargy and skin was dusky. Informed Dr. Ireland,
placed pt on trendelenberg, orderes flowed. Monitored pt vs til pt was gerry
to the ICU, called  to inform of the situations.
 
All belongings and medication was brought down to the ICU along with her
wheelchair. POC followed, report given to the ICU nurse.

## 2019-09-08 NOTE — NUR
DR QUINTERO CALLED FOR LOW BP AND ABG'S  PH 7.31  LEFT ORDERS TO DC BICARB GTT
AND TO INCREASE MIDDRINE TO 10 MG TID. HE STATES THE PT IS  RENAL AND
ALWAYS RUNS A LOW BP.  WILL CONT TO MONITOR.

## 2019-09-09 VITALS — DIASTOLIC BLOOD PRESSURE: 57 MMHG | SYSTOLIC BLOOD PRESSURE: 80 MMHG

## 2019-09-09 VITALS — DIASTOLIC BLOOD PRESSURE: 51 MMHG | SYSTOLIC BLOOD PRESSURE: 90 MMHG

## 2019-09-09 VITALS — SYSTOLIC BLOOD PRESSURE: 79 MMHG | DIASTOLIC BLOOD PRESSURE: 43 MMHG

## 2019-09-09 VITALS — SYSTOLIC BLOOD PRESSURE: 87 MMHG | DIASTOLIC BLOOD PRESSURE: 57 MMHG

## 2019-09-09 VITALS — SYSTOLIC BLOOD PRESSURE: 80 MMHG | DIASTOLIC BLOOD PRESSURE: 46 MMHG

## 2019-09-09 VITALS — DIASTOLIC BLOOD PRESSURE: 46 MMHG | SYSTOLIC BLOOD PRESSURE: 78 MMHG

## 2019-09-09 VITALS — SYSTOLIC BLOOD PRESSURE: 76 MMHG | DIASTOLIC BLOOD PRESSURE: 43 MMHG

## 2019-09-09 VITALS — SYSTOLIC BLOOD PRESSURE: 95 MMHG | DIASTOLIC BLOOD PRESSURE: 60 MMHG

## 2019-09-09 VITALS — DIASTOLIC BLOOD PRESSURE: 43 MMHG | SYSTOLIC BLOOD PRESSURE: 79 MMHG

## 2019-09-09 VITALS — SYSTOLIC BLOOD PRESSURE: 94 MMHG | DIASTOLIC BLOOD PRESSURE: 55 MMHG

## 2019-09-09 VITALS — DIASTOLIC BLOOD PRESSURE: 52 MMHG | SYSTOLIC BLOOD PRESSURE: 100 MMHG

## 2019-09-09 VITALS — DIASTOLIC BLOOD PRESSURE: 30 MMHG | SYSTOLIC BLOOD PRESSURE: 97 MMHG

## 2019-09-09 VITALS — SYSTOLIC BLOOD PRESSURE: 94 MMHG | DIASTOLIC BLOOD PRESSURE: 56 MMHG

## 2019-09-09 VITALS — DIASTOLIC BLOOD PRESSURE: 45 MMHG | SYSTOLIC BLOOD PRESSURE: 79 MMHG

## 2019-09-09 VITALS — DIASTOLIC BLOOD PRESSURE: 46 MMHG | SYSTOLIC BLOOD PRESSURE: 90 MMHG

## 2019-09-09 VITALS — SYSTOLIC BLOOD PRESSURE: 86 MMHG | DIASTOLIC BLOOD PRESSURE: 51 MMHG

## 2019-09-09 LAB
ALBUMIN SERPL-MCNC: 1.9 G/DL (ref 3.4–5)
ALT SERPL-CCNC: 17 U/L (ref 30–65)
ANION GAP SERPL CALC-SCNC: 12 MMOL/L (ref 7–16)
AST SERPL-CCNC: 16 U/L (ref 15–37)
BASOPHILS NFR BLD AUTO: 0.4 % (ref 0–2)
BILIRUB SERPL-MCNC: 0.2 MG/DL
BUN SERPL-MCNC: 29 MG/DL (ref 7–18)
CALCIUM SERPL-MCNC: 9.2 MG/DL (ref 8.5–10.1)
CHLORIDE SERPL-SCNC: 98 MMOL/L (ref 98–107)
CO2 SERPL-SCNC: 25 MMOL/L (ref 21–32)
CREAT SERPL-MCNC: 6.7 MG/DL (ref 0.6–1)
EOSINOPHIL NFR BLD: 0 % (ref 0–3)
ERYTHROCYTE [DISTWIDTH] IN BLOOD BY AUTOMATED COUNT: 17.6 % (ref 10.5–14.5)
GLUCOSE SERPL-MCNC: 163 MG/DL (ref 74–106)
GRANULOCYTES NFR BLD MANUAL: 92.8 % (ref 36–66)
HCT VFR BLD CALC: 26.8 % (ref 37–47)
HGB BLD-MCNC: 8.3 GM/DL (ref 12–15)
LYMPHOCYTES NFR BLD AUTO: 3.4 % (ref 24–44)
MAGNESIUM SERPL-MCNC: 2.3 MG/DL (ref 1.8–2.4)
MCH RBC QN AUTO: 25 PG (ref 26–34)
MCHC RBC AUTO-ENTMCNC: 30.8 G/DL (ref 28–37)
MCV RBC: 81.1 FL (ref 80–100)
MONOCYTES NFR BLD: 3.4 % (ref 1–8)
NEUTROPHILS # BLD: 10.1 THOU/UL (ref 1.4–8.2)
PHOSPHATE SERPL-MCNC: 9.4 MG/DL (ref 2.5–4.9)
PLATELET # BLD: 378 THOU/UL (ref 150–400)
POTASSIUM SERPL-SCNC: 4.1 MMOL/L (ref 3.5–5.1)
PROT SERPL-MCNC: 6.3 G/DL (ref 6.4–8.2)
RBC # BLD AUTO: 3.31 MIL/UL (ref 4.2–5)
SODIUM SERPL-SCNC: 135 MMOL/L (ref 136–145)
WBC # BLD AUTO: 10.9 THOU/UL (ref 4–11)

## 2019-09-09 PROCEDURE — 5A1D70Z PERFORMANCE OF URINARY FILTRATION, INTERMITTENT, LESS THAN 6 HOURS PER DAY: ICD-10-PCS | Performed by: HOSPITALIST

## 2019-09-09 PROCEDURE — 5A09357 ASSISTANCE WITH RESPIRATORY VENTILATION, LESS THAN 24 CONSECUTIVE HOURS, CONTINUOUS POSITIVE AIRWAY PRESSURE: ICD-10-PCS | Performed by: HOSPITALIST

## 2019-09-09 NOTE — NUR
FOLLOWING FOR DC PLANNING. CLINICAL INFO REVIEWED. PT TRANSFERRED TO ICU R/T
SEPSIS WITH HYPOTENSION. ALSO REQUIRING BIPAP WHICH IS OFF NOW AND ON 3 LITERS
NC. ON IV ABX AND WOUND CARE (FOOT OSTEO). PT LIVES AT HOME WITH SPOUSE AND
HAD BEEM W/C LEVEL PRIOR TO ADMIT. PLANNING FOR HOME WITH HH RESUMPTION
([facility]).

## 2019-09-09 NOTE — NUR
PT RESTING QUIETLY. STARTED ON BIPAP PER ORDER. REMAINS ANURIC
SBP 93/50  MAP 73   WILL HAVE DIALYSIS THIS AM.  RIGHT FOOT DRESSING INTACT.
SINUS RHYTHM. PT REMAINS A DNR.  WILL CONT TO MONITOR

## 2019-09-09 NOTE — NUR
SBP 78/44  CHERELLE HERNANDEZ NP NOTIFIED. MIDODRINE 5 MG ORDERED AND GIVEN AS A
ONE TIME DOSE. WILL CONT TO MONITOR.

## 2019-09-10 VITALS — SYSTOLIC BLOOD PRESSURE: 84 MMHG | DIASTOLIC BLOOD PRESSURE: 45 MMHG

## 2019-09-10 VITALS — SYSTOLIC BLOOD PRESSURE: 80 MMHG | DIASTOLIC BLOOD PRESSURE: 44 MMHG

## 2019-09-10 VITALS — SYSTOLIC BLOOD PRESSURE: 87 MMHG | DIASTOLIC BLOOD PRESSURE: 42 MMHG

## 2019-09-10 VITALS — DIASTOLIC BLOOD PRESSURE: 49 MMHG | SYSTOLIC BLOOD PRESSURE: 82 MMHG

## 2019-09-10 VITALS — SYSTOLIC BLOOD PRESSURE: 85 MMHG | DIASTOLIC BLOOD PRESSURE: 58 MMHG

## 2019-09-10 VITALS — SYSTOLIC BLOOD PRESSURE: 90 MMHG | DIASTOLIC BLOOD PRESSURE: 55 MMHG

## 2019-09-10 VITALS — SYSTOLIC BLOOD PRESSURE: 87 MMHG | DIASTOLIC BLOOD PRESSURE: 48 MMHG

## 2019-09-10 VITALS — DIASTOLIC BLOOD PRESSURE: 50 MMHG | SYSTOLIC BLOOD PRESSURE: 83 MMHG

## 2019-09-10 VITALS — DIASTOLIC BLOOD PRESSURE: 42 MMHG | SYSTOLIC BLOOD PRESSURE: 78 MMHG

## 2019-09-10 VITALS — SYSTOLIC BLOOD PRESSURE: 92 MMHG | DIASTOLIC BLOOD PRESSURE: 54 MMHG

## 2019-09-10 VITALS — DIASTOLIC BLOOD PRESSURE: 42 MMHG | SYSTOLIC BLOOD PRESSURE: 93 MMHG

## 2019-09-10 VITALS — SYSTOLIC BLOOD PRESSURE: 96 MMHG | DIASTOLIC BLOOD PRESSURE: 56 MMHG

## 2019-09-10 VITALS — DIASTOLIC BLOOD PRESSURE: 48 MMHG | SYSTOLIC BLOOD PRESSURE: 93 MMHG

## 2019-09-10 VITALS — SYSTOLIC BLOOD PRESSURE: 84 MMHG | DIASTOLIC BLOOD PRESSURE: 51 MMHG

## 2019-09-10 VITALS — DIASTOLIC BLOOD PRESSURE: 54 MMHG | SYSTOLIC BLOOD PRESSURE: 97 MMHG

## 2019-09-10 VITALS — DIASTOLIC BLOOD PRESSURE: 53 MMHG | SYSTOLIC BLOOD PRESSURE: 94 MMHG

## 2019-09-10 VITALS — DIASTOLIC BLOOD PRESSURE: 47 MMHG | SYSTOLIC BLOOD PRESSURE: 87 MMHG

## 2019-09-10 VITALS — DIASTOLIC BLOOD PRESSURE: 72 MMHG | SYSTOLIC BLOOD PRESSURE: 93 MMHG

## 2019-09-10 VITALS — DIASTOLIC BLOOD PRESSURE: 51 MMHG | SYSTOLIC BLOOD PRESSURE: 92 MMHG

## 2019-09-10 VITALS — SYSTOLIC BLOOD PRESSURE: 86 MMHG | DIASTOLIC BLOOD PRESSURE: 47 MMHG

## 2019-09-10 VITALS — DIASTOLIC BLOOD PRESSURE: 53 MMHG | SYSTOLIC BLOOD PRESSURE: 88 MMHG

## 2019-09-10 VITALS — SYSTOLIC BLOOD PRESSURE: 92 MMHG | DIASTOLIC BLOOD PRESSURE: 49 MMHG

## 2019-09-10 LAB
ANION GAP SERPL CALC-SCNC: 15 MMOL/L (ref 7–16)
APTT BLD: 34.7 SECONDS (ref 24.5–32.8)
BUN SERPL-MCNC: 13 MG/DL (ref 7–18)
CALCIUM SERPL-MCNC: 8.7 MG/DL (ref 8.5–10.1)
CHLORIDE SERPL-SCNC: 98 MMOL/L (ref 98–107)
CO2 SERPL-SCNC: 21 MMOL/L (ref 21–32)
CREAT SERPL-MCNC: 3.6 MG/DL (ref 0.6–1)
ERYTHROCYTE [DISTWIDTH] IN BLOOD BY AUTOMATED COUNT: 17.3 % (ref 10.5–14.5)
GLUCOSE SERPL-MCNC: 80 MG/DL (ref 74–106)
HCT VFR BLD CALC: 24.6 % (ref 37–47)
HGB BLD-MCNC: 7.7 GM/DL (ref 12–15)
INR PPP: 1.3
MAGNESIUM SERPL-MCNC: 1.9 MG/DL (ref 1.8–2.4)
MCH RBC QN AUTO: 25.2 PG (ref 26–34)
MCHC RBC AUTO-ENTMCNC: 31.3 G/DL (ref 28–37)
MCV RBC: 80.5 FL (ref 80–100)
PLATELET # BLD: 391 THOU/UL (ref 150–400)
POTASSIUM SERPL-SCNC: 3.6 MMOL/L (ref 3.5–5.1)
PROTHROMBIN TIME: 13.5 SECONDS (ref 9.3–11.4)
RBC # BLD AUTO: 3.06 MIL/UL (ref 4.2–5)
SODIUM SERPL-SCNC: 134 MMOL/L (ref 136–145)
WBC # BLD AUTO: 9.9 THOU/UL (ref 4–11)

## 2019-09-10 PROCEDURE — 5A09357 ASSISTANCE WITH RESPIRATORY VENTILATION, LESS THAN 24 CONSECUTIVE HOURS, CONTINUOUS POSITIVE AIRWAY PRESSURE: ICD-10-PCS | Performed by: HOSPITALIST

## 2019-09-10 PROCEDURE — 0Y6P0Z1 DETACHMENT AT RIGHT 1ST TOE, HIGH, OPEN APPROACH: ICD-10-PCS

## 2019-09-10 NOTE — NUR
PT SCHEDULED FOR SURGERY TODAY. MORE APPROPRIATE FOR O.T. TO ASSESS AFTER
SURGERY WTIH NEW ORDERS DUE TO CHANGE IN STATUS. RN INFORMED.

## 2019-09-10 NOTE — HC
[facility]
[address]
[place]   [address]                     CONSULTATION                  
_______________________________________________________________________________
 
Name:       GAVINO NGUYEN                   Room #:         239-P       ADM IN  
M.R.#:      [id number]                       Account #:      [id number]  
Admission:  09/03/19    Attend Phys:    Ambrose Cha MD      
Discharge:              Date of Birth:  [birth year]  
                                                          Report #: [id number]
                                                                    [id number]   
_______________________________________________________________________________
THIS REPORT FOR:   //name//                          
 
CC: James Read
 
DATE OF SERVICE:  09/09/2019
 
 
CHIEF COMPLAINT:  Followup of nonhealing ulceration to the right distal hallux
amputation site.  Prior MRI was negative for osteomyelitis of the distal first
metatarsal, and it was noted that the great toe phalanges were surgically
absent.  Recent foot radiographs show a residual portion on the base of the
proximal phalanx, which is articulating with the first metatarsal.  The patient
continues to have significant pain to the area with light touch.  She has been
afebrile, stable appetite.  Recently transferred to the ICU for hypotension, on
pressors.  She is alert and oriented to her prior baseline.  She dialyzed
yesterday.  She is on parenteral Zosyn and vancomycin, status post angiogram
with noted stenosis to the right SFA, not felt to be flow limiting.  Blood
cultures pending, no growth so far.  Right foot wound culture pending, no
organisms on Gram stain.
 
LABORATORY DATA:  WBC 10.9, RBC 3.31, hemoglobin 8.3, hematocrit 26.8, platelets
378.  BUN 29, creatinine 6.7, glucose 163.
 
PHYSICAL EXAMINATION:  Nonhealing ulceration to the prior right hallux
amputation site with some scant purulence at the lateral wound margin.  No
visible bone with scant pale granulation across the wound bed.  The area is
exquisitely tender to the touch, low-grade inflammation, no signs of acute
vascular embarrassment.  The adjacent second toe is intact with a scab over the
dorsal base of the phalanges.  No signs of gangrene or deep infection to the
second toe.  Nonpalpable pedal pulses to the right foot.
 
IMPRESSION:  Likely osteomyelitis, residual phalangeal base and possibly distal
first metatarsal.
 
PLAN:  We will plan surgical debridement of the right foot tomorrow to include
resection of the residual phalangeal base and likely distal first metatarsal
with bone and soft tissue cultures.  I discussed the procedure with the patient
and I will notify family.  I will keep her n.p.o. past midnight.
 
 
 
  <ELECTRONICALLY SIGNED>
   By: Dylan Simeon DPM          
  09/10/19     1840
D: 09/09/19 1917                           _____________________________________
T: 09/10/19 0437                           Dylan Simeon DPM            /nt

## 2019-09-10 NOTE — NUR
ASSUMED CARE OF PT AT 1900. PT DROWSY, ORIENTED TO PERSON, PLACE AND
SITUATION. DR PALACIOS IN ROOM AT 1900 DISCUSSING RESECTION OF RIGHT GREAT TOE
SURGERY. PT MADE NPO AFTER 0000. PT HAS BEEN C/O RIGHT GREAT TOE PAIN. PRN
PAIN MEDS GIVEN PER ORDER. THIS AM PT STARTED ON FENTANYL DUE TO NPO STATUS.
SR WITH PVCs ON THE MONITOR. BP SOFT THROUGH OUT NIGHT. WILL CONTINUE TO
MONITOR PT.

## 2019-09-10 NOTE — HC
[facility]
[address]
[place]   [address]                     CONSULTATION                  
_______________________________________________________________________________
 
Name:       GAVINO NGUYEN                   Room #:         239-P       ADM IN  
M.R.#:      [id number]                       Account #:      [id number]  
Admission:  09/03/19    Attend Phys:    Ambrose Cha MD      
Discharge:              Date of Birth:  [birth year]  
                                                          Report #: [id number]
                                                                    [id number]   
_______________________________________________________________________________
THIS REPORT FOR:   //name//                          
 
CC: James Read
 
DATE OF SERVICE:  09/07/2019
 
 
ADMISSION DIAGNOSES:  Sepsis, right foot infection.
 
HISTORY OF PRESENT ILLNESS:  The patient is a 74-year-old female admitted for
septicemia with increased pain and swelling to the right distal foot with open
postoperative wound to the hallux amputation site.  She is currently on
parenteral vancomycin and ceftriaxone.  She had an angiogram 2 days ago, which
showed mild right SFA stenosis that was not felt to be flow limiting.  Recent
foot post-admission MRI shows inflammation to the right second toe intermediate
and distal phalanges consistent with osteomyelitis, but there is no bone
destruction.  The MRI had no signs of infection to the distal right first
metatarsal at the hallux amputation disarticulation site.  The patient is alert
and oriented, relates moderate pain with palpation to the right distal foot.
 
LABORATORY DATA:  WBC 10.9, hemoglobin 8.2, hematocrit 26.2, platelets 327.  BUN
16, creatinine 3.5, glucose 65.  Albumin 2.2.
 
PHYSICAL EXAMINATION:  The patient is status post left BKA.  Her right foot has
a wound at the hallux amputation site that is transversely oriented measuring
roughly 5.0 x 2.0 x 1.0 cm.  I cannot palpate bone, as her some soft tissue
covering the distal first metatarsal.  I cannot probe to the adjacent second
digit or second MTP joint.  There is no fluctuance or crepitation.  The foot is
slightly warm to the touch.  The distal foot and toes are very painful to the
touch.  The second toe has a scab at the dorsal proximal aspect, but no signs of
an open wound or signs of deep infection.  Toenails are dystrophic without
paronychia.  No right popliteal adenopathy noted, negative Homans' and Rico
sign to right leg.
 
IMPRESSION:  Postoperative wound to right hallux amputation site, no clinical
signs of abscess.
 
PLAN:  I do not recommend surgical intervention at this time.  I do not see any
evidence of an abscess to the wound, as unable to probe to the distal first
metatarsal with a sterile Q-tip and there is no expressible fluid.  There is no
fluctuance or crepitation or clinical signs of abscess.  I do not feel there is
osteomyelitis to the right second toe, as there are no penetrating wound to the
anteromedial distal phalanges, and the existing hallux amputation wound does not
 
 
 
[address]   [address]                     CONSULTATION                  
_______________________________________________________________________________
 
Name:       GAVINO NGUYEN                   Room #:         239-P       ADM IN  
M.R.#:      [id number]                       Account #:      [id number]  
Admission:  09/03/19    Attend Phys:    Ambrose Cha MD      
Discharge:              Date of Birth:  [birth year]  
                                                          Report #: [id number]
                                                                    [id number]   
_______________________________________________________________________________
track in that direction.  Also, I did order a foot radiographs to look for any
lytic bone destruction.  I recommend continuing palliative wound care to the
right foot per the wound care team.  I do not recommend any formal surgical
debridement at this time.
 
 
 
 
 
 
 
 
 
 
 
 
 
 
 
 
 
 
 
 
 
 
 
 
 
 
 
 
 
 
 
 
 
 
 
 
 
 
 
 
  <ELECTRONICALLY SIGNED>
   By: Dylan Simeon DPM          
  09/10/19     1840
D: 09/07/19 1131                           _____________________________________
T: 09/08/19 0007                           Dylan Simeon DPM            /nt

## 2019-09-10 NOTE — NUR
PT IS ON SERVICE WITH [facility] FAXED CLINICAL UPDATE SPOKE WITH MARIE IN
INTAKE SHE RECEIVED UPDATE AND THEY RESUME HH AT DISCHARGE. ANTICPATE DC
TOMORROW. DCP TO FOLLOW.

## 2019-09-10 NOTE — NUR
PT TO HAVE TOE AMPUTATED LATER TODAY. SEEN BY P.T. AND PT AND SPOUSE GOAL IS
TO RETURN HOME AT DC WITH [facility]. PT W/C LEVEL PRIOR TO ADMIT AND
TRANSFERS EASILY AND SPOUSE ABLE TO ASSIST. PT AND SPOUSE DO NOT WANT SKILLED
REHAB. REQUESTED DC PLANNER CALL [facility] TO UPDATE IF CURRENTLY ON SERVICE
OR FAX REFERRAL FOR POSSIBLE DC HOME 9/11/19 (DC TIMELINE PER DR. LOGAN
TODAY IN [facility]). THERAPY WILL SEE AFTER AMPUTATION AS WELL.

## 2019-09-10 NOTE — NUR
patient to have sx today. Discussed post acute care with sp and patient. They
prefer home with , [facility] care. Gave [employer] skilled list to review if
post acute care needed at [place].

## 2019-09-11 VITALS — DIASTOLIC BLOOD PRESSURE: 50 MMHG | SYSTOLIC BLOOD PRESSURE: 93 MMHG

## 2019-09-11 VITALS — SYSTOLIC BLOOD PRESSURE: 110 MMHG | DIASTOLIC BLOOD PRESSURE: 71 MMHG

## 2019-09-11 VITALS — SYSTOLIC BLOOD PRESSURE: 102 MMHG | DIASTOLIC BLOOD PRESSURE: 58 MMHG

## 2019-09-11 VITALS — SYSTOLIC BLOOD PRESSURE: 94 MMHG | DIASTOLIC BLOOD PRESSURE: 58 MMHG

## 2019-09-11 VITALS — DIASTOLIC BLOOD PRESSURE: 60 MMHG | SYSTOLIC BLOOD PRESSURE: 107 MMHG

## 2019-09-11 VITALS — SYSTOLIC BLOOD PRESSURE: 111 MMHG | DIASTOLIC BLOOD PRESSURE: 65 MMHG

## 2019-09-11 VITALS — SYSTOLIC BLOOD PRESSURE: 92 MMHG | DIASTOLIC BLOOD PRESSURE: 54 MMHG

## 2019-09-11 VITALS — DIASTOLIC BLOOD PRESSURE: 80 MMHG | SYSTOLIC BLOOD PRESSURE: 109 MMHG

## 2019-09-11 VITALS — SYSTOLIC BLOOD PRESSURE: 95 MMHG | DIASTOLIC BLOOD PRESSURE: 57 MMHG

## 2019-09-11 VITALS — SYSTOLIC BLOOD PRESSURE: 120 MMHG | DIASTOLIC BLOOD PRESSURE: 78 MMHG

## 2019-09-11 VITALS — DIASTOLIC BLOOD PRESSURE: 77 MMHG | SYSTOLIC BLOOD PRESSURE: 113 MMHG

## 2019-09-11 VITALS — DIASTOLIC BLOOD PRESSURE: 43 MMHG | SYSTOLIC BLOOD PRESSURE: 92 MMHG

## 2019-09-11 VITALS — DIASTOLIC BLOOD PRESSURE: 44 MMHG | SYSTOLIC BLOOD PRESSURE: 87 MMHG

## 2019-09-11 VITALS — SYSTOLIC BLOOD PRESSURE: 98 MMHG | DIASTOLIC BLOOD PRESSURE: 53 MMHG

## 2019-09-11 VITALS — SYSTOLIC BLOOD PRESSURE: 111 MMHG | DIASTOLIC BLOOD PRESSURE: 64 MMHG

## 2019-09-11 VITALS — SYSTOLIC BLOOD PRESSURE: 121 MMHG | DIASTOLIC BLOOD PRESSURE: 72 MMHG

## 2019-09-11 VITALS — DIASTOLIC BLOOD PRESSURE: 45 MMHG | SYSTOLIC BLOOD PRESSURE: 87 MMHG

## 2019-09-11 VITALS — DIASTOLIC BLOOD PRESSURE: 56 MMHG | SYSTOLIC BLOOD PRESSURE: 94 MMHG

## 2019-09-11 VITALS — SYSTOLIC BLOOD PRESSURE: 94 MMHG | DIASTOLIC BLOOD PRESSURE: 55 MMHG

## 2019-09-11 VITALS — SYSTOLIC BLOOD PRESSURE: 118 MMHG | DIASTOLIC BLOOD PRESSURE: 63 MMHG

## 2019-09-11 VITALS — SYSTOLIC BLOOD PRESSURE: 105 MMHG | DIASTOLIC BLOOD PRESSURE: 58 MMHG

## 2019-09-11 VITALS — SYSTOLIC BLOOD PRESSURE: 81 MMHG | DIASTOLIC BLOOD PRESSURE: 49 MMHG

## 2019-09-11 VITALS — DIASTOLIC BLOOD PRESSURE: 55 MMHG | SYSTOLIC BLOOD PRESSURE: 95 MMHG

## 2019-09-11 VITALS — DIASTOLIC BLOOD PRESSURE: 46 MMHG | SYSTOLIC BLOOD PRESSURE: 85 MMHG

## 2019-09-11 VITALS — DIASTOLIC BLOOD PRESSURE: 47 MMHG | SYSTOLIC BLOOD PRESSURE: 83 MMHG

## 2019-09-11 VITALS — DIASTOLIC BLOOD PRESSURE: 63 MMHG | SYSTOLIC BLOOD PRESSURE: 112 MMHG

## 2019-09-11 VITALS — SYSTOLIC BLOOD PRESSURE: 92 MMHG | DIASTOLIC BLOOD PRESSURE: 66 MMHG

## 2019-09-11 VITALS — DIASTOLIC BLOOD PRESSURE: 78 MMHG | SYSTOLIC BLOOD PRESSURE: 123 MMHG

## 2019-09-11 VITALS — DIASTOLIC BLOOD PRESSURE: 52 MMHG | SYSTOLIC BLOOD PRESSURE: 91 MMHG

## 2019-09-11 VITALS — SYSTOLIC BLOOD PRESSURE: 113 MMHG | DIASTOLIC BLOOD PRESSURE: 70 MMHG

## 2019-09-11 VITALS — DIASTOLIC BLOOD PRESSURE: 74 MMHG | SYSTOLIC BLOOD PRESSURE: 103 MMHG

## 2019-09-11 LAB
ALBUMIN SERPL-MCNC: 1.7 G/DL (ref 3.4–5)
ANION GAP SERPL CALC-SCNC: 10 MMOL/L (ref 7–16)
BUN SERPL-MCNC: 15 MG/DL (ref 7–18)
CALCIUM SERPL-MCNC: 8.3 MG/DL (ref 8.5–10.1)
CHLORIDE SERPL-SCNC: 100 MMOL/L (ref 98–107)
CO2 SERPL-SCNC: 26 MMOL/L (ref 21–32)
CREAT SERPL-MCNC: 4 MG/DL (ref 0.6–1)
ERYTHROCYTE [DISTWIDTH] IN BLOOD BY AUTOMATED COUNT: 17.6 % (ref 10.5–14.5)
GLUCOSE SERPL-MCNC: 76 MG/DL (ref 74–106)
HCT VFR BLD CALC: 23.9 % (ref 37–47)
HGB BLD-MCNC: 7.5 GM/DL (ref 12–15)
MCH RBC QN AUTO: 25.1 PG (ref 26–34)
MCHC RBC AUTO-ENTMCNC: 31.2 G/DL (ref 28–37)
MCV RBC: 80.4 FL (ref 80–100)
PHOSPHATE SERPL-MCNC: 5.5 MG/DL (ref 2.5–4.9)
PLATELET # BLD: 338 THOU/UL (ref 150–400)
POTASSIUM SERPL-SCNC: 3.3 MMOL/L (ref 3.5–5.1)
RBC # BLD AUTO: 2.98 MIL/UL (ref 4.2–5)
SODIUM SERPL-SCNC: 136 MMOL/L (ref 136–145)
WBC # BLD AUTO: 9.3 THOU/UL (ref 4–11)

## 2019-09-11 PROCEDURE — 5A1D70Z PERFORMANCE OF URINARY FILTRATION, INTERMITTENT, LESS THAN 6 HOURS PER DAY: ICD-10-PCS | Performed by: HOSPITALIST

## 2019-09-11 PROCEDURE — 5A09357 ASSISTANCE WITH RESPIRATORY VENTILATION, LESS THAN 24 CONSECUTIVE HOURS, CONTINUOUS POSITIVE AIRWAY PRESSURE: ICD-10-PCS | Performed by: HOSPITALIST

## 2019-09-11 NOTE — NUR
PT ARRIVED TO ICU FROM RECOVERY ACCOMPANIED BY 2 RN's AT 1900. M/S TELE
STATUS. BP SOFT THROUGH OUT THE NIGHT. HD THIS AM PER RENAL ORDERS. PT
TOLERATING WELL. WILL MONITOR FOR DISRHYTHMIAS WHILE ON HD.

## 2019-09-11 NOTE — NUR
Converted to SR 90's after Lopressor 5mg slow IVP, Amiodarone bolus of 150mg
IVPB, and start of Amiodarone gtt at 1 mg/min.  Hemodialysis completed.  500ml
UF removed.

## 2019-09-11 NOTE — NUR
AMiodarone gtt started at 1005 after 150mg IVPB bolus at 1 mg/min x6 hrs.
Decreased to 0.5mg/min at 1605 as ordered.  Pt remains in SR.

## 2019-09-11 NOTE — EKG
[place] [address] [facility]
[place]  [address]
Phone:  [phone]                    ELECTROCARDIOGRAM REPORT      
_______________________________________________________________________________
 
Name:       GAVINO NGUYEN                   Room #:         239-P       ADM IN  
M.R.#:      [id number]     Account #:      [id number]  
Admission:  19    Attend Phys:    Ambrose Cha MD      
Discharge:              Date of Birth:  [birth year]  
                                                          Report #: [id number]
   [id number]
_______________________________________________________________________________
THIS REPORT FOR:   //name//                          
 
                          [facility]
                                       
Test Date:    2019               Test Time:    07:26:49
Pat Name:     GAVINO NGUYEN              Department:   
Patient ID:   [id number]            Room:         239 P
Gender:       F                        Technician:   ryan
:          1945               Requested By: Karina Ovalle
Order Number: [id number] MD:   Jose Waterman
                                 Measurements
Intervals                              Axis          
Rate:         157                      P:            
AR:                                    QRS:          39
QRSD:         79                       T:            
QT:           289                                    
QTc:          468                                    
                           Interpretive Statements
Atrial fibrillation with rapid V-rate
Low voltage, extremity leads
Repolarization abnormality, prob rate related
Compared to ECG 2019 08:49:21
Atrial fibrillation has replaced sinus tachycardia
nonspecific change in the ST and T-wave segments
Electronically Signed On 2019 8:23:14 CDT by Jose Waterman
[email or web address]
 
 
 
 
 
 
 
 
 
 
 
 
 
 
 
 
 
  <ELECTRONICALLY SIGNED>
   By: Jose Waterman MD, [facility]   
  19     0823
D: 19                           _____________________________________
T: 19                           Jose Waterman MD, [facility]     /EPI

## 2019-09-11 NOTE — NUR
THIS AM AT 0702, WHILE RECEIVING HD, PT WENT INTO A-FIB WITH RVR. DR QUINTERO
PAGED AND SPOKE COMMUNICATED TO DIALYSIS NURSE THAT HE WANTED CARDIOLOGY
CONSULTED. CARDIOLOGY ANSWERING SERVICE FOR CARDIOLOGY CALLED AT 0715, STILL
AWAITING CALL RETURN. DR LOGAN PAGED ANG GAVE ORDERS FOR AMIODARONE BOLUS.
REPORT GIVEN TO ONCOMING RNs, CECILIA AND AUNDREA.

## 2019-09-12 VITALS — DIASTOLIC BLOOD PRESSURE: 72 MMHG | SYSTOLIC BLOOD PRESSURE: 118 MMHG

## 2019-09-12 VITALS — DIASTOLIC BLOOD PRESSURE: 56 MMHG | SYSTOLIC BLOOD PRESSURE: 96 MMHG

## 2019-09-12 VITALS — SYSTOLIC BLOOD PRESSURE: 102 MMHG | DIASTOLIC BLOOD PRESSURE: 61 MMHG

## 2019-09-12 VITALS — SYSTOLIC BLOOD PRESSURE: 88 MMHG | DIASTOLIC BLOOD PRESSURE: 61 MMHG

## 2019-09-12 VITALS — DIASTOLIC BLOOD PRESSURE: 70 MMHG | SYSTOLIC BLOOD PRESSURE: 88 MMHG

## 2019-09-12 PROCEDURE — 5A09357 ASSISTANCE WITH RESPIRATORY VENTILATION, LESS THAN 24 CONSECUTIVE HOURS, CONTINUOUS POSITIVE AIRWAY PRESSURE: ICD-10-PCS | Performed by: HOSPITALIST

## 2019-09-12 NOTE — NUR
WOUND CARE FOLLOW UP;
ROUNDING WITH DR JOSE MARSHALL TODAY. THE DRESSING WAS ALREADY DONE TODAY AND
IT IS VERY PAINFUL TO THE PATIENT.  DR MARSHALL WAS ABLE TO ASSESS THE TOES.
 
RECOMMENDATIONS;  CONTINUE CURRENT TREATMENT

## 2019-09-12 NOTE — NUR
PT. ARRIVED AT FLOOR AROUND 0130; PT. CRYING; C/O PAIN; ALERT TO PERSON; ST.
"LEAVE ME ALONE"; PRN PAIN MEDICATION GIVEN; PAIN RE-ASSESSMENT PT. SLEEPING;
AROUND 0330 SLEEP INTERRUPTED TO CHANGE COLOSTOMY BAG; PT. AOX4; ST. DECREASE
PAIN; HELPED CHANGE COLOSTOMY BAG; CHECK CHARTING; VS WNL; SINUS RYTHM THROUGH
THE NIGHT; ABLE TO REST WITH EYES CLOSED FOR A FEW HOURS; MONITORING; WILL
PASS ON REPORT.

## 2019-09-12 NOTE — NUR
ASSUMED CARE OF PT AT SHIFT CHANGE. ASSESSMENTS AS CHARTED. MED GIVEN PER MAR.
VSS. A&O. PT WORKED BRIEFLY WITH PHYS THERAPY. PAIN MANAGED WTIH PO AND IV
MEDS.  PT HAS COLOSTOMY BAG AND IS ANURIC. WAITING FOR AUTH TO DC TO SNF.
WILL CONTINUE TO MONITOR AND FOLLOW POC.

## 2019-09-12 NOTE — NUR
SNF recommendations discussed with the pt at bedside and her spouse via phone.
Pt initially was adament about returning home with Jennifer MCCOY and going to her
outpt dialysis. After talking with her spouse, she is now agreeable to a snf
stay due to her need for skilled monitoring, o2 weaning, pain mgnt, therapy
for transfer training and adl's. SNF list for [employer] reviewed. The
pt's dialysis clinic is [facility] at [address] and her
schedule is 11:15am MWF. The pt has been to snf in the past and prefers to go
to [facility]  on [address]. Dc planner faxed the
referral. Writer spoke with Sylwia in admissions and they can accept pending ins
auth. She will submit this afternoon. The pt is normally not on o2 or bipap at
home. Her dialysis clinic needs her dc summary, flow sheets, labs, and
consults faxed to [phone], phone [phone]. [facility] can provide w/c van
transport to dialysis. Jennifer MCCOY updated on the dc plan as well as the
attending and the care team. Will follow and arrange her transfer once auth
obtained.

## 2019-09-13 VITALS — DIASTOLIC BLOOD PRESSURE: 55 MMHG | SYSTOLIC BLOOD PRESSURE: 104 MMHG

## 2019-09-13 VITALS — SYSTOLIC BLOOD PRESSURE: 98 MMHG | DIASTOLIC BLOOD PRESSURE: 57 MMHG

## 2019-09-13 VITALS — DIASTOLIC BLOOD PRESSURE: 53 MMHG | SYSTOLIC BLOOD PRESSURE: 90 MMHG

## 2019-09-13 VITALS — SYSTOLIC BLOOD PRESSURE: 96 MMHG | DIASTOLIC BLOOD PRESSURE: 54 MMHG

## 2019-09-13 PROCEDURE — 5A1D70Z PERFORMANCE OF URINARY FILTRATION, INTERMITTENT, LESS THAN 6 HOURS PER DAY: ICD-10-PCS | Performed by: HOSPITALIST

## 2019-09-13 NOTE — NUR
RECEIVED PT'S CARE AT 1900; PT. ON BED; RESTING WITH EYES CLOSED; SLEEP
INTERRUPTED DURING SHIFT CHANGE; ALERT; DROWSY; RECOGNIZED NURSE COMMING BACK;
DURING ASSESSMENT C/O PAIN; 8/10; PRN PAIN MEDICATION GIVEN TOGETHER WITH HS
MEDICATION; SBP ON THE 90s; NO C/O SOB; NO C/O HEADACHE; PAIN RE-ASSESSMENT
PT. RESTING WITH EYES CLOSED; TURNED FROM SIDE TO SIDE THROUGH THE NIGHT;
SYNUS RYM; MONITORING; ASSESSMENT AS CHARGED; FOLLOWING POC; WILL PASS ON
REPORT.

## 2019-09-13 NOTE — PATH
[facility]
[address]
[place]   [address]                     PATHOLOGY RPT PROCEDURE       
_______________________________________________________________________________
 
Name:       GAVINO NGUYEN                   Room #:         202-P       ADM IN  
M.R.#:      [id number]     Account #:      [id number]  
Admission:  09/03/19    Date of Birth:  [birth year]  
Discharge:                                              Report #:    [id number]
                                                        Path Case #: [id number]
_______________________________________________________________________________
 
LCA Accession Number: [id number]
.                                                                01
Material submitted:                                        .
toe - RIGHT DISTAL 1ST METATARSAL AND RESIDUAL GREAT TOE BONE. Modifiers:
right, distal
.                                                                01
Clinical history:                                          .
Osteomyelitis right first metatarsal
.                                                                02
**********************************************************************
Diagnosis:
Toe, right distal first metatarsal and residual great toe bone,
amputation:
- Fragments of bone showing acute and chronic osteomyelitis.
- Inked margins showing viable and unremarkable bone as well as cartilage.
(IUV:becky; 09/13/2019)
QMS  09/13/2019  1345 Local
**********************************************************************
.                                                                02
Electronically signed:                                     .
Manuela Shipley MD, Pathologist
NPI- [id number]
.                                                                01
Gross description:                                         .
The specimen is received in formalin, labeled "Gavino Nguyen, right distal
first metatarsal and residual great toe".  Received is a segment of bone
displaying one blunt margin, consistent with transection, and one smooth,
convex margin, consistent with disarticulation, measuring 2.6 x 2.3 x 2.0
cm in greatest dimensions.  The transected margin is inked black.  A
full-thickness cross-section is submitted in cassette A1, following
decalcification.
.
Also received within the specimen container is a segment of pale tan soft
tissue with attached bone displaying two smooth, does articulated margins
measuring 3.8 x 2.4 x 1.0 cm in greatest dimensions.  One of the
disarticulated margins is fractured in appearance.  These margins are
inked black.  A full thickness cross-section is submitted in cassette A2,
following decalcification.
(CAA; 9/12/2019)
QA/[facility]  09/12/2019  0926 Local
.                                                                02
Pathologist provided ICD-10:
M86.171, M86.671
.                                                                02
CPT                                                        .
369578, 532594
Specimen Comment: A courtesy copy of this report has been sent to
 
 
[place] [address]                     PATHOLOGY RPT PROCEDURE       
_______________________________________________________________________________
 
Name:       GAVINO NGUYEN                   Room #:         202-P       ADM IN  
M.R.#:      [id number]     Account #:      [id number]  
Admission:  09/03/19    Date of Birth:  [birth year]  
Discharge:                                              Report #:    [id number]
                                                        Path Case #: [id number]
_______________________________________________________________________________
Specimen Comment: [phone], [phone].
Specimen Comment: Report sent to  / DR KAHN
***Performed at:  01
   [address], [place], [place]  [id number]
   MD Dejan Cardoso MD Phone:  [phone]
***Performed at:  02
   [address]  [id number]
   MD Manuela Shipley MD Phone:  [phone]

## 2019-09-13 NOTE — NUR
Auth rec'd from the ins per Sylwia in admissions at [facility]. They will transport
the pt at 5pm today. Dc orders faxed and confirmed with Sylwia. Dc time and plan
confirmed with the pt at bedside and her spouse via phone. He will meet her at
the facility at 5:30pm. Chart copy and scripts ready to be sent with the pt.
Nursing to call report. Dc summary and instr faxed to her dialysis clinic as
well for resumption on Monday at 11:15am. SNF to transport her. Care team
updated. Pt off bipap and weaning down on o2 at 2liters.

## 2019-09-13 NOTE — NUR
ASSUMED CARE AT 1300, SHIFT ASSESSMENT DONE, MEDS GIVEN, VSS. REPORTED PAIN,
PRN PAIN MEDS GIVEN. DIALSIS M/W/F. DISCHARGE ORDER RECEIVED, BOTH PERIPHERAL
IV'S WAS TAKEN OUT. COLOSTOMY IN PLACE. REPORT CALLED TO MOHSEN AND GIVEN TO
JACKIE. PT LEFT WITH TRANSPORTATION AT 1800

## 2019-09-13 NOTE — NUR
AWAKE, ALERT, CALM, COOPERATIVE. ASSISTED WITH BREAKFAST TRAY AND FEEDS
HERSELF. A.M. MEDS HELD PER DIALYSIS RN; DIALYSIS IN PROGRESS NOW. FALL
PRECAUTIONS IN PLACE. CLOSE TO NURSES' STATION. FREQUENT CHECKS. WILL CONTINUE
TO MONITOR.

## 2019-09-13 NOTE — NUR
PT DISCHARGING TODAY TO [facility] RECEIVED CONFIRMATION LEFT MSG
WITH LARS IN ADM THAT DC ORDERS/SUMMARY HAVE BEEN FAXED.

## 2019-09-24 ENCOUNTER — HOSPITAL ENCOUNTER (INPATIENT)
Dept: HOSPITAL 35 - HYPER | Age: 74
LOS: 21 days | Discharge: NURSING, REHAB OR LONG-TERM CARE | DRG: 270 | End: 2019-10-15
Attending: INTERNAL MEDICINE | Admitting: INTERNAL MEDICINE
Payer: COMMERCIAL

## 2019-09-24 VITALS — SYSTOLIC BLOOD PRESSURE: 113 MMHG | DIASTOLIC BLOOD PRESSURE: 60 MMHG

## 2019-09-24 VITALS — BODY MASS INDEX: 16.78 KG/M2 | WEIGHT: 92.37 LBS | HEIGHT: 62.01 IN

## 2019-09-24 DIAGNOSIS — E83.42: ICD-10-CM

## 2019-09-24 DIAGNOSIS — E43: ICD-10-CM

## 2019-09-24 DIAGNOSIS — J18.9: ICD-10-CM

## 2019-09-24 DIAGNOSIS — Z79.01: ICD-10-CM

## 2019-09-24 DIAGNOSIS — E87.6: ICD-10-CM

## 2019-09-24 DIAGNOSIS — Z89.512: ICD-10-CM

## 2019-09-24 DIAGNOSIS — L03.115: ICD-10-CM

## 2019-09-24 DIAGNOSIS — D63.8: ICD-10-CM

## 2019-09-24 DIAGNOSIS — Z87.891: ICD-10-CM

## 2019-09-24 DIAGNOSIS — D62: ICD-10-CM

## 2019-09-24 DIAGNOSIS — Z89.411: ICD-10-CM

## 2019-09-24 DIAGNOSIS — Z79.899: ICD-10-CM

## 2019-09-24 DIAGNOSIS — M86.8X7: ICD-10-CM

## 2019-09-24 DIAGNOSIS — F32.9: ICD-10-CM

## 2019-09-24 DIAGNOSIS — Z99.2: ICD-10-CM

## 2019-09-24 DIAGNOSIS — I73.9: Primary | ICD-10-CM

## 2019-09-24 DIAGNOSIS — N18.6: ICD-10-CM

## 2019-09-24 DIAGNOSIS — Z88.5: ICD-10-CM

## 2019-09-24 DIAGNOSIS — L89.323: ICD-10-CM

## 2019-09-24 DIAGNOSIS — Z79.82: ICD-10-CM

## 2019-09-24 DIAGNOSIS — I48.0: ICD-10-CM

## 2019-09-24 DIAGNOSIS — I95.9: ICD-10-CM

## 2019-09-24 DIAGNOSIS — I12.0: ICD-10-CM

## 2019-09-24 DIAGNOSIS — Z90.49: ICD-10-CM

## 2019-09-24 LAB
%HYPO/RBC NFR BLD AUTO: SLIGHT %
ALBUMIN SERPL-MCNC: 2.8 G/DL (ref 3.4–5)
ANION GAP SERPL CALC-SCNC: 11 MMOL/L (ref 7–16)
ANISOCYTOSIS BLD QL SMEAR: (no result)
BUN SERPL-MCNC: 17 MG/DL (ref 7–18)
CALCIUM SERPL-MCNC: 9.4 MG/DL (ref 8.5–10.1)
CHLORIDE SERPL-SCNC: 99 MMOL/L (ref 98–107)
CO2 SERPL-SCNC: 25 MMOL/L (ref 21–32)
CREAT SERPL-MCNC: 4.6 MG/DL (ref 0.6–1)
EOSINOPHIL NFR BLD: 14 % (ref 0–3)
ERYTHROCYTE [DISTWIDTH] IN BLOOD BY AUTOMATED COUNT: 19.5 % (ref 10.5–14.5)
GLUCOSE SERPL-MCNC: 94 MG/DL (ref 74–106)
GRANULOCYTES NFR BLD MANUAL: 63 % (ref 36–66)
HCT VFR BLD CALC: 33.6 % (ref 37–47)
HGB BLD-MCNC: 10.2 GM/DL (ref 12–15)
LG PLATELETS BLD QL SMEAR: (no result)
LYMPHOCYTES NFR BLD AUTO: 14 % (ref 24–44)
MAGNESIUM SERPL-MCNC: 1.7 MG/DL (ref 1.8–2.4)
MCH RBC QN AUTO: 24.2 PG (ref 26–34)
MCHC RBC AUTO-ENTMCNC: 30.3 G/DL (ref 28–37)
MCV RBC: 79.7 FL (ref 80–100)
MICROCYTES: (no result)
MONOCYTES NFR BLD: 9 % (ref 1–8)
NEUTROPHILS # BLD: 9.6 THOU/UL (ref 1.4–8.2)
PHOSPHATE SERPL-MCNC: 5.5 MG/DL (ref 2.5–4.9)
PLATELET # BLD: 554 THOU/UL (ref 150–400)
POLYCHROMASIA BLD QL SMEAR: (no result)
POTASSIUM SERPL-SCNC: 3.8 MMOL/L (ref 3.5–5.1)
RBC # BLD AUTO: 4.21 MIL/UL (ref 4.2–5)
SODIUM SERPL-SCNC: 135 MMOL/L (ref 136–145)
WBC # BLD AUTO: 15.3 THOU/UL (ref 4–11)

## 2019-09-24 PROCEDURE — 50010 RENAL EXPLORATION: CPT

## 2019-09-24 PROCEDURE — 56524: CPT

## 2019-09-24 PROCEDURE — 32100 EXPLORATION OF CHEST: CPT

## 2019-09-24 PROCEDURE — 50101: CPT

## 2019-09-24 PROCEDURE — 56526: CPT

## 2019-09-24 PROCEDURE — 10783: CPT

## 2019-09-24 PROCEDURE — 57091: CPT

## 2019-09-24 PROCEDURE — 70005: CPT

## 2019-09-24 PROCEDURE — 62900: CPT

## 2019-09-24 PROCEDURE — 62110: CPT

## 2019-09-24 PROCEDURE — 50386 REMOVE STENT VIA TRANSURETH: CPT

## 2019-09-24 PROCEDURE — 53000 INCISION OF URETHRA: CPT

## 2019-09-24 PROCEDURE — 51412: CPT

## 2019-09-24 PROCEDURE — 56527: CPT

## 2019-09-24 PROCEDURE — 10102: CPT

## 2019-09-24 NOTE — H
[facility]
[address]
[place]   [address]                     HISTORY AND PHYSICAL          
_______________________________________________________________________________
 
Name:       GAVINO NGUYEN                   Room #:         202-P       [facility] [place]  
M.R.#:      [id number]                       Account #:      [id number]  
Admission:  19    Attend Phys:    Ambrose Cha MD      
Discharge:  19    Date of Birth:  [birth year]  
                                                          Report #: [id number]
                                                                    [id number]   
_______________________________________________________________________________
THIS REPORT FOR:   //name//                          
 
CC: James Read
 
DATE OF SERVICE:  2019
 
 
CHIEF COMPLAINT:  Progressively worsening pain and cellulitis in the right lower
extremity and therefore is being admitted as a direct admission by Dr. William Read.
 
HISTORY OF PRESENT ILLNESS:  The patient is a very pleasant 74-year-old female
who informs me that she has been having severe pain for the past 2 weeks with
progressively worsening redness in the right foot.  The patient had amputation
done of the right toe approximately 6 months ago and has not had any improvement
in the skin cellulitis as well as the wound.  The patient keeps followup
appointment.  She does have known end-stage renal disease as well as a severe
peripheral vascular disease.  The patient informs me that she has not had any
fever, shaking chills, night sweats.  The wound is progressively worse in the
last 2 weeks and more worse than wound is her pain.  The patient informs me that
she was on hydrocodone at home and that was not controlling her pain at all. 
The patient denies any fever, chills, night sweats.  The patient denies any
nausea or vomiting.  She always has diarrhea she informs me since she had
colostomy, which was put approximately 33 years ago because of her Crohn's
disease.  The patient informs me that in the past IV fentanyl has worked well in
the hospital settings and she would really like some pain relief.  The patient
denies any dizziness, lightheadedness or fall or any chest pain, chest pressure,
cough, sputum production, shortness of breath, orthopnea, paroxysmal nocturnal
dyspnea.
 
PATIENT'S PRIMARY CARE PHYSICIAN:  Dr. James Arellano.
 
PATIENT'S NEPHROLOGIST:  Dr. Hannah.
 
ALLERGIES:  THE PATIENT IS ALLERGIC TO CODEINE, WHICH CAUSES HIVES OR RASH.
 
PERSONAL AND SOCIAL HISTORY:  The patient smoked 1 pack per day, but quit 5
years ago.  Denies any alcohol intake.  She wishes to be DNR.  Her emergency
contact can be reached at [phone], is her , Joya.
 
FAMILY HISTORY:  The patient informs me that mom had all kinds of health
problems and she passed away at the age of 83 years; however, dad  in his
80s with prostate cancer.
 
 
 
[address]   [address]                     HISTORY AND PHYSICAL          
_______________________________________________________________________________
 
Name:       GAVINO NGUYEN                   Room #:         202-P       [facility] [place]  
M.R.#:      [id number]                       Account #:      [id number]  
Admission:  19    Attend Phys:    Ambrose Cha MD      
Discharge:  19    Date of Birth:  [birth year]  
                                                          Report #: [id number]
                                                                    [id number]   
_______________________________________________________________________________
 
REVIEW OF SYSTEMS:  A 10-point review of systems was done, please see HPI above.
 The patient has a colostomy with soft stool noted.  The patient denies any
black stool or melanotic stool or hematochezia.
 
PHYSICAL EXAMINATION:
VITAL SIGNS:  Temperature 36.3, heart rate 70, respirations 16, blood pressure
86/48, which improved to 95/50, pulse oximeter 90-97% on room air.
GENERAL:  Alert and oriented to time, place and person, a very pleasant
74-year-old female who is lean and thin and is complaining of pain 8/10.
HEENT:  Normocephalic, atraumatic.  Pupils equally round, reactive to light. 
Conjunctivae are clear.  Sclerae nonicteric.  Oropharynx clear.  Mucous
membranes moist.
NECK:  Supple, no JVD, no lymphadenopathy.
HEART:  S1, S2 regular.  No murmur, no S3, no S4.
LUNGS:  Distant breath sounds, but clear to auscultation bilaterally without any
crackles or wheezes.
ABDOMEN:  Soft, nontender, nondistended, normal active bowel sounds.
CHEST:  The patient has a dialysis port on the right anterior chest.
EXTREMITIES:  Right foot has been in the dressing and Dr. William Read had done
the dressing change today this afternoon and that there is no drainage noted. 
The patient has left leg BKA and right toe amputated and other than that, no
skin breakdown noted.
NEUROLOGIC:  Exam is completely nonfocal.
 
PAST MEDICAL HISTORY:  Significant for:
1.  End-stage renal disease.
2.  Peripheral vascular disease, left leg.
3.  Crohn's disease, status post total colectomy and colostomy, not sure if the
colectomy was done because of Crohn's disease; however, the patient informs me
it was done 33 years ago.
 
PAST SURGICAL HISTORY:
1.  The patient had a dialysis port catheter placed.
2.  Right toe amputation.
3.  Colostomy.
 
HOME MEDICATIONS:
1.  Promethazine 5 mg daily.
2.  Nephro-Pratima daily.
3.  Sevelamer 0.8 gram packet t.i.d.
4.  Xarelto 2.5 mg daily.
5.  Neurontin 100 mg t.i.d.
6.  Norco.
7.  Ultram 50 mg t.i.d. p.r.n.
8.  Sulfasalazine 500 mg daily.  The patient has completed doxycycline treatment
 
 
 
[facility]
[address]
[place]   [address]                     HISTORY AND PHYSICAL          
_______________________________________________________________________________
 
Name:       GAVINO NGUYEN                   Room #:         202-P       [facility] [place]  
M.R.#:      [id number]                       Account #:      [id number]  
Admission:  19    Attend Phys:    Ambrose Cha MD      
Discharge:  19    Date of Birth:  [birth year]  
                                                          Report #: [id number]
                                                                    [id number]   
_______________________________________________________________________________
outpatient as well.
9.  Plavix 75 mg daily.
10.  Florinef 0.1 mg daily; however, the patient does not have any of her
medications with her.
 
LABORATORY DATA:  WBC 11.3, hemoglobin 9.7, hematocrit 31.0, platelet count 351,
differential with segmented neutrophils 76.6%.  Mild lymphopenia noted. 
Absolute neutrophil count is 8600.
 
Chemistry is pending and the results will be called to Ms. Radha Adams who is
on call tonight.
 
ASSESSMENT AND PLAN:
1.  Nonhealing right foot wound and cellulitis.  We will go ahead and start
empirically vancomycin and the patient has received doxycycline treatment
outpatient.  I do not have any cultures available.  We will discuss with Dr. William Read tomorrow.  Consult has been placed for Dr. William Read as well
as a renal consult has been entered for hemodialysis.  Vancomycin per pharmacy
consult has been done and MRSA nares will be ordered.
2.  For end-stage renal disease, Nephrology has been consulted.
3.  Crohn's disease with a history of being on sulfasalazine at home.  I do not
have the dosing and interval available.  We will wait for the list of
medications obtained from the patient's pharmacy.
4.  Hypotension.  We will give gentle IV fluids and the patient is also noted to
be bradycardic with a heart rate of 46, we will go ahead and get a 12-lead EKG
as well and if the EKG shows any heart block, then we will move the patient to a
telemetry unit or we will put telemetry on board.
5.  The patient wishes to be DNR.  The orders are written and plan of care was
discussed with the patient.
6.  For anemia, we will go ahead and do iron studies tomorrow morning as well as
a ferritin, TIBC, and B12 and retic panel.  Likely, the patient has anemia of
chronic disease.  We will also get the labs from her nephrologist once we have
the information available.  We will send stool for Hemoccult testing.
 
 
 
 
 
 
 
 
 
 
 
  <ELECTRONICALLY SIGNED>
   By: Leatha Ireland MD          
  19     0652
D: 19 2219                           _____________________________________
T: 19 2250                           Leatha Ireland MD            /nt

## 2019-09-24 NOTE — NUR
PT ARRIVED TO FLOOR AS DIRECT ADMIT FROM DR PACHECO'S OFFICE PER WC AT 1800.
ADMISSION HX AND ASSESSMENT COMPLETED.DR KAHN NOTIFIED,SAIDANP ROUNDED ON PT
AND ORDERS NOTED.CONSULT CALLED TO SEVERAL DOCTORS. RECEIVED CALL FROM DR GONZALEZ.ORDER RECEIVED.REPORT OFF TO NOC.

## 2019-09-25 VITALS — DIASTOLIC BLOOD PRESSURE: 51 MMHG | SYSTOLIC BLOOD PRESSURE: 105 MMHG

## 2019-09-25 VITALS — DIASTOLIC BLOOD PRESSURE: 58 MMHG | SYSTOLIC BLOOD PRESSURE: 99 MMHG

## 2019-09-25 VITALS — DIASTOLIC BLOOD PRESSURE: 45 MMHG | SYSTOLIC BLOOD PRESSURE: 94 MMHG

## 2019-09-25 VITALS — DIASTOLIC BLOOD PRESSURE: 53 MMHG | SYSTOLIC BLOOD PRESSURE: 95 MMHG

## 2019-09-25 NOTE — NUR
Pt is requesting renal diet be liberalized to regular diet.  Will need
approval from renal.  Also recommend add phosp binder for persistant high phos
levels.

## 2019-09-25 NOTE — NUR
FAXED REFERRALTO [facility] SPOKE WITH LARS IN ADM SHE RECEIVED
REFERRAL AND WILL FOLLOW PT WHILE HERE. DCP TO FOLLOW.

## 2019-09-25 NOTE — NUR
Assumed care of pt at 0700. Pt alert and oriented x4. C/o pain in right foot.
Prn pain meds administered. IV antibiotics infusing. Dialysis MWF. IR
procedure scheduled tomorrow. Consent signed and in chart. NPO after midnight.
Colostomy in place. Fall precautions in place. Report given to macario RICKS.

## 2019-09-25 NOTE — NUR
INITIAL ASSESSMENT:
Pt evaluated for d/c planning needs.  Reviewed chart and spoke with nurse, pt
and spouse.  Pt lives in house with spouse and was independent with ADL's
prior to previous hospitalization.  Pt was hospitalized at [facility] the beginning
of September and went to [facility].  Pt is scheduled to be d/c
from facility on Friday.  Pt is being evaluated by wound care.  Asked
discharge planner to fax referral to facility.  Will remain available to
assist as needed.

## 2019-09-26 VITALS — DIASTOLIC BLOOD PRESSURE: 61 MMHG | SYSTOLIC BLOOD PRESSURE: 3 MMHG

## 2019-09-26 VITALS — DIASTOLIC BLOOD PRESSURE: 60 MMHG | SYSTOLIC BLOOD PRESSURE: 90 MMHG

## 2019-09-26 VITALS — DIASTOLIC BLOOD PRESSURE: 56 MMHG | SYSTOLIC BLOOD PRESSURE: 96 MMHG

## 2019-09-26 VITALS — SYSTOLIC BLOOD PRESSURE: 97 MMHG | DIASTOLIC BLOOD PRESSURE: 44 MMHG

## 2019-09-26 LAB
ALBUMIN SERPL-MCNC: 2.5 G/DL (ref 3.4–5)
ANION GAP SERPL CALC-SCNC: 14 MMOL/L (ref 7–16)
ANISOCYTOSIS BLD QL SMEAR: (no result)
BASOPHILS NFR BLD AUTO: 0 % (ref 0–2)
BUN SERPL-MCNC: 10 MG/DL (ref 7–18)
CALCIUM SERPL-MCNC: 9.3 MG/DL (ref 8.5–10.1)
CHLORIDE SERPL-SCNC: 103 MMOL/L (ref 98–107)
CO2 SERPL-SCNC: 23 MMOL/L (ref 21–32)
CREAT SERPL-MCNC: 3.1 MG/DL (ref 0.6–1)
EOSINOPHIL NFR BLD: 14 % (ref 0–3)
ERYTHROCYTE [DISTWIDTH] IN BLOOD BY AUTOMATED COUNT: 19.7 % (ref 10.5–14.5)
GLUCOSE SERPL-MCNC: 78 MG/DL (ref 74–106)
GRANULOCYTES NFR BLD MANUAL: 69 % (ref 36–66)
HCT VFR BLD CALC: 34.5 % (ref 37–47)
HGB BLD-MCNC: 10.3 GM/DL (ref 12–15)
LYMPHOCYTES NFR BLD AUTO: 9 % (ref 24–44)
MCH RBC QN AUTO: 24 PG (ref 26–34)
MCHC RBC AUTO-ENTMCNC: 29.8 G/DL (ref 28–37)
MCV RBC: 80.6 FL (ref 80–100)
MONOCYTES NFR BLD: 8 % (ref 1–8)
NEUTROPHILS # BLD: 8.6 THOU/UL (ref 1.4–8.2)
NEUTS BAND NFR BLD: 0 % (ref 0–8)
OVALOCYTES BLD QL SMEAR: (no result)
PHOSPHATE SERPL-MCNC: 3.9 MG/DL (ref 2.5–4.9)
PLATELET # BLD: 401 THOU/UL (ref 150–400)
POLYCHROMASIA BLD QL SMEAR: SLIGHT
POTASSIUM SERPL-SCNC: 3.2 MMOL/L (ref 3.5–5.1)
RBC # BLD AUTO: 4.29 MIL/UL (ref 4.2–5)
SODIUM SERPL-SCNC: 140 MMOL/L (ref 136–145)
WBC # BLD AUTO: 12.5 THOU/UL (ref 4–11)

## 2019-09-26 NOTE — NUR
ASSUMED CARE OF PATIENT AT 0700. ARTERIOGRAM SCHEDULED TODAY. PT NPO SINCE
1200 MIDNIGHT. NO PO MEDICATIONS GIVEN DUE TO NPO STATUS. PT PAIN RATED AS A
10 ON A NUMERIC SCALE OF 1-10. FENTANYL ORDERED Q4 AND PT GIVEN MEDICATION AT
1012. NS D/C. WOUND CLEANSED WITH NS AND XEROFORM APPLIED ALONG WITH KERLIX
AND SECURED WITH TAPE. BED IN LOW POSITION. CALL LIGHT WITHIN REACH. WILL
CONTINUE TO MONITOR THE PT.

## 2019-09-26 NOTE — HC
[facility]
Frannie Suarez
[place]   [address]                     CONSULTATION                  
_______________________________________________________________________________
 
Name:       GAVINO NGUYEN                   Room #:         421-P       ADM IN  
M.R.#:      [id number]                       Account #:      [id number]  
Admission:  09/24/19    Attend Phys:    Ambrose Cha MD      
Discharge:              Date of Birth:  [birth year]  
                                                          Report #: [id number]
                                                                    [id number]   
_______________________________________________________________________________
THIS REPORT FOR:   //name//                          
 
CC: James Merritthens
 
DATE OF SERVICE:  09/25/2019
 
 
CHIEF COMPLAINT:  Necrosis of right second toe.
 
HISTORY OF PRESENT ILLNESS:  This is a 74-year-old female patient with whom I am
familiar from recent hospitalization.  She underwent a prior amputation of her
right great toe.  There was some residual portion of the first proximal phalanx.
 This was resected and primary closure was performed by Dr. Simeon on 09/10/2019.
 She has had continued pain; however, since that time, that was felt to be
ischemic.  She did undergo angiography by Dr. Tremayne De Luna on 09/05/2019.  At
that point in time, a mild 30% right superficial femoral artery stenosis and
right distal popliteal artery stenosis was not felt to be flow limiting since
satisfactory right anterior tibial and peroneal artery runoff into the small
disease, dorsalis pedis was noted.  Dr. De Luna did comment that there was some
motion artifact associated with the procedure.  The patient has great difficulty
in lying still.  We did discuss the possibility of additional angiography under
anesthesia.  She has now been admitted with persistent pain and some necrosis of
the right second toe and I have been asked to see her with regard to ongoing
wound care.
 
PAST MEDICAL HISTORY:  Positive for history of end-stage renal disease,
requiring dialysis.  The patient has a history of Crohn's disease, status post
colectomy and colostomy.  She had previous left leg BKA.
 
MEDICATIONS:  Include amiodarone, oxycodone, Florinef, clotrimazole.
 
SOCIAL HISTORY:  The patient is a former smoker.  The patient admits to
occasional alcohol use.
 
FAMILY HISTORY:  Noncontributory.
 
REVIEW OF SYSTEMS:
CONSTITUTIONAL:  The patient denies fever, chills or weight loss.
NEUROLOGICAL:  The patient denies focal weakness, numbness or tingling.
EYES:  The patient denies visual changes, redness, or drainage.
ENT:  The patient denies earache, nasal drainage, sore throat.
CARDIOVASCULAR:  The patient denies chest pain, palpitations or diaphoresis.
PULMONARY:  The patient denies cough or shortness of breath.
GASTROINTESTINAL:  The patient denies nausea, vomiting, diarrhea, or abdominal
 
 
 
[address]   [address]                     CONSULTATION                  
_______________________________________________________________________________
 
Name:       GAVINO NGUYEN                   Room #:         421-P       ADM IN  
[facility]#:      [id number]                       Account #:      [id number]  
Admission:  09/24/19    Attend Phys:    Ambrose Cha MD      
Discharge:              Date of Birth:  [birth year]  
                                                          Report #: [id number]
                                                                    [id number]   
_______________________________________________________________________________
pain.
ORTHOPEDIC:  The patient complains of significant pain involving her right foot,
ankle region as well as the incision site involving the previous surgery on the
right great toe.
 
LABORATORY DATA:  Include sodium 135, potassium 3.8, chloride 99, CO2 of 25, BUN
17, creatinine 4.6, glucose of 94, albumin is 2.8.  White blood cell count 15.3
with a hemoglobin of 10.2, hematocrit 33.6.  Sed rate is 30.
 
PHYSICAL EXAMINATION:
VITAL SIGNS:  At this time include temperature 36.4, pulse 62, respiratory rate
of 16, blood pressure 94/45.
GENERAL:  This is a chronically ill-appearing female patient who appears to be
in moderate distress.
HEENT:  Head normocephalic.  Nose and throat are clear.
NECK:  Supple.
LUNGS:  Clear.
ABDOMEN:  Soft.
EXTREMITIES:  Examination of the right lower extremity demonstrates the
surgically absent right great toe.  The incision line is still mostly intact. 
There is now some necrosis; however, involving the right second toe involving
the proximal interphalangeal joint and proximal phalanx.  The patient's foot is
warm to palpation.  It is tender to palpation, but not overtly infected or
cellulitic.
 
CLINICAL IMPRESSION:
1.  Necrosis of the right second toe following prior revision of right great toe
amputation.
2.  Significant peripheral arterial disease.
3.  End-stage renal disease, requiring hemodialysis.
 
RECOMMENDATIONS:  At this point in time, patient I think may require revision
and higher level amputation of portion of the foot.  I have discussed this in
detail with the patient and her son.  She may not possess adequate vascularity
to heal a transmetatarsal level amputation and with her ischemic pain she may be
better suited with below-knee amputation.  We have asked Interventional
Radiology to take a look at her again and she is tentatively scheduled for
additional right lower extremity angiography with anesthesia.  If we are not
able to improve flow and she continues to have ischemic pain, we will need to
further entertain higher level amputation.  I have discussed this in detail with
the patient's son as well as the patient at the bedside.  They voiced
understanding and currently are in agreement with the plan of care.
 
 
 
[address]   [address]                     CONSULTATION                  
_______________________________________________________________________________
 
Name:       GAVINO NGUYEN                   Room #:         421-P       ADM IN  
M.R.#:      [id number]                       Account #:      [id number]  
Admission:  09/24/19    Attend Phys:    Ambrose Cha MD      
Discharge:              Date of Birth:  [birth year]  
                                                          Report #: [id number]
                                                                    [id number]   
_______________________________________________________________________________
 
I appreciate being asked to see her in consultation.
 
 
 
 
 
 
 
 
 
 
 
 
 
 
 
 
 
 
 
 
 
 
 
 
 
 
 
 
 
 
 
 
 
 
 
 
 
 
 
 
 
 
  <ELECTRONICALLY SIGNED>
   By: Jame Gunderson MD        
  09/26/19     1821
D: 09/25/19 1703                           _____________________________________
T: 09/26/19 0215                           Jame Gunderson MD          /nt

## 2019-09-26 NOTE — NUR
PT RETURNED FROM IR AT 1800 FROM HAVING A ARTERIOGRAM PERFORMED. NASAL CANNULA
AT 2.0 LITERS AND O2 SATURATION . PT IS NPO. PT IS ADVISED TO REMAIN
FLAT ON HER BACK UNTIL 2000. VITAL SIGNS ARE WITHIN NORMAL RANGE. PT HAS A EG
INSERTED IN THE LEFT SIDE OF HER NECK. BED IN LOW POSITION AND HOB IN FLAT
POSITION. CALL LIGHT WITHIN REACH. WILL CONTINUE TO MONITOR PT.

## 2019-09-26 NOTE — NUR
ASSESSMENT COMPLETED.PT C/O PAIN,MANAGED WITH MED.NEW ORDER NOTED FOR IVF,NP
ON DUTY NOTIFIED,ORDR NOTED TO DC IVF,ORDER CARRIED OUT.DRSG TO HER R FOOT
C/D/I.REPOSITIONED WHILE IN BED.COLOSTOMY WITH LIGHT GREEN STOOL IN THE
BAG.PT NPO AT THIS TIME FOR A PROCEDURE IN THE AM.FALL PRECAUTIONS IN
PLACE,CALL LIGHT WITHIN REACH.

## 2019-09-27 VITALS — DIASTOLIC BLOOD PRESSURE: 49 MMHG | SYSTOLIC BLOOD PRESSURE: 92 MMHG

## 2019-09-27 VITALS — DIASTOLIC BLOOD PRESSURE: 51 MMHG | SYSTOLIC BLOOD PRESSURE: 98 MMHG

## 2019-09-27 VITALS — DIASTOLIC BLOOD PRESSURE: 51 MMHG | SYSTOLIC BLOOD PRESSURE: 91 MMHG

## 2019-09-27 NOTE — HC
[facility]
Frannie Suarez
[place]   [address]                     CONSULTATION                  
_______________________________________________________________________________
 
Name:       GAVINO NGUYEN                   Room #:         421-P       ADM IN  
M.R.#:      [id number]                       Account #:      [id number]  
Admission:  09/24/19    Attend Phys:    Ambrose Cha MD      
Discharge:              Date of Birth:  [birth year]  
                                                          Report #: [id number]
                                                                    [id number]   
_______________________________________________________________________________
THIS REPORT FOR:   //name//                          
 
CC: James Read
 
DATE OF SERVICE:  09/25/2019
 
 
INFECTIOUS DISEASE CONSULTATION.
 
REASON FOR CONSULTATION:  I was asked to evaluate concerning right foot
nonhealing incision and ischemic second toe.
 
HISTORY OF PRESENT ILLNESS:  The patient is a 74-year-old with underlying
peripheral vascular disease, end-stage renal disease, who underwent a right
first toe amputation along with distal transmetatarsal amputation on 09/03/2019
by Dr. Tan.  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  There
was osteomyelitis to the distal phalanx and throughout the toe.  She had
significant arterial stenosis, thought reasonable enough, however, to heal this
surgical area.  There was plan for right distal anterior tibial artery
intervention.  She was dismissed on Augmentin and vancomycin.  She followed [facility] noting worsening right foot.  No fever,
chills or sweats.  Fair amount of pain in the distal foot.  She has had a
previous left BKA.  She has a colostomy from a history of a sacral decubitus. 
No issues there at this point.  She has end-stage renal disease with failed AV
fistulas and grafts.  She now has a right chest tunneled dialysis catheter.
 
ALLERGIES:  CODEINE.
 
MEDICATIONS:  As noted on her MAR, now on vancomycin and Zosyn.  Also,
amiodarone, oxycodone, Florinef, gabapentin, multivitamin, midodrine, aspirin,
Xarelto, Sensipar.
 
PAST MEDICAL HISTORY:  End-stage renal disease; Crohn's disease, status post
colectomy with colostomy, right lower quadrant; left BKA; right great toe
amputation; right chest dialysis tunneled catheter.
 
FAMILY HISTORY:  Noncontributory.
 
SOCIAL HISTORY:  Past smoker, no significant alcohol intake.
 
REVIEW OF SYSTEMS:  Denies any cough or sputum production.  No nausea or
vomiting.  Does not walk.  Very weak.  Otherwise, 10-point review was negative
other than what is described above.
 
 
 
 
[address]   [address]                     CONSULTATION                  
_______________________________________________________________________________
 
Name:       GAVINO NGUYEN                   Room #:         421-P       [facility] [place]  
M.RUDI.#:      [id number]                       Account #:      [id number]  
Admission:  09/24/19    Attend Phys:    Ambrose Cha MD      
Discharge:              Date of Birth:  [birth year]  
                                                          Report #: [id number]
                                                                    [id number]   
_______________________________________________________________________________
PHYSICAL EXAMINATION:
VITAL SIGNS:  She is afebrile, hemodynamically stable.
GENERAL:  Alert and cooperative.  Small in stature, very thin.
SKIN:  Unremarkable other than what is described on her extremity examination. 
She did have a small pressure wound that was scarred over her sacrum.  No
palpable adenopathy.
EYES:  Without scleral icterus.
MOUTH:  Without mucositis.
NECK:  Supple.  Right chest tunneled dialysis catheter site without tenderness
or drainage.
LUNGS:  Clear.
HEART:  Regular, without murmur, gallop or rub.
ABDOMEN:  Soft and nontender with no hepatosplenomegaly or mass.  The colostomy
was functioning well.
GENITOURINARY:  External genitalia with no rash or wound.
RECTAL:  Not performed.
EXTREMITIES:  With left BKA stump, was well approximated.  Right lower extremity
with 1+ edema below the knee.  There was distal foot incision over the dorsum
first metatarsal region from her amputation.  There was separation of the
incision distally.  There was minimal drainage.  There is a small area of
erythema.  There was pale right second toe, which was markedly tender.  There
was small wound with no drainage.  Pulses were not palpable in the popliteal or
foot.
NEUROLOGIC:  Cranial nerves intact.  Strength in her upper extremities was
normal.  Sensation diminished.  Toes were significantly tender to touch.
PSYCHIATRIC:  Mood normal.
 
LABORATORY STUDIES:  Sed rate 30.  Hemoglobin 10, white count 15, platelet count
554,000.  Creatinine 4.6.  Angio is pending.  Differential showed 14%
eosinophils.
 
IMPRESSION:  A 74-year-old with ischemia involving the right distal foot.  The
second toe has gangrene.  She has been on antibiotic therapy since discharge. 
The eosinophilia I suspect is more related to her vancomycin use.  So far, I do
not see the need for this given her culture results.  We will then continue with
Zosyn.  Await angiogram and hopefully open up her distal vasculature.  I suspect
the patient will require amputation of her second toe at a minimum.  We will
continue with Zosyn and wound care pending angiogram.
 
 
 
 
 
 
  <ELECTRONICALLY SIGNED>
   By: Dylan Chi MD            
  09/27/19     1849
D: 09/25/19 1043                           _____________________________________
T: 09/25/19 2238                           Dylan Chi MD              /nt

## 2019-09-27 NOTE — HC
[facility]
Frannie Suarez
[place]   [address]                     CONSULTATION                  
_______________________________________________________________________________
 
Name:       GAVINO NGUYEN                   Room #:         202-P       [facility] [place]  
M.R.#:      [id number]                       Account #:      [id number]  
Admission:  09/03/19    Attend Phys:    Ambrose Cha MD      
Discharge:  09/13/19    Date of Birth:  [birth year]  
                                                          Report #: [id number]
                                                                    [id number]   
_______________________________________________________________________________
THIS REPORT FOR:   //name//                          
 
CC: James Read
 
DATE OF SERVICE:  09/09/2019
 
 
CHIEF COMPLAINT:  Nonhealing ulceration to right distal hallux amputation site
with type 2 diabetes mellitus and PAD.  The patient has a nonhealing wound from
prior hallux amputation with residual phalangeal base articulating with the
distal first metatarsal.  MRI report was suggestive of osteomyelitis of the
second toe intermediate distal phalanges, but there is no wound in that region. 
She is on parenteral Zosyn and vancomycin with good tolerance.  Recent blood and
wound cultures are pending.  She had recent aortogram during this
hospitalization, which showed some stenosis to the right superficial femoral
artery, which was not thought to be flow limiting.
 
LABORATORY DATA:  WBC 10.9, RBC 3.31, hemoglobin 8.3, hematocrit 26.8, platelets
378.  BUN 29, creatinine 6.7, glucose 163.
 
PHYSICAL EXAMINATION:  Ulceration to the right hallux amputation site measures
roughly 2.5 x 3.0 x 0.8 cm.  No visible bone, the wound bed has a pale
fibronecrotic slough with scant purulence at the lateral aspect.  The wound does
not communicate with the second toe or second MTP joint.  The foot is cool to
touch with nonpalpable dorsalis pedis and posterior tibial pulses.  There is no
cyanosis or signs of acute vascular embarrassment.  The wound margins have
delayed capillary refill and are cool to touch.  There is no healthy granulation
to the wound bed, and the periwound is very painful to the touch.
 
IMPRESSION:  Osteomyelitis to residual proximal phalangeal base at first
metatarsophalangeal joint.
 
PLAN:  I recommend resection of the residual phalangeal base from the distal
first metatarsal and resection of the metatarsal head to ensure clear margins. 
Based on reviewing the foot radiographs, the remaining phalangeal base has
osteolysis and communicates with the wound, therefore it is likely infected and
necrotic.  I will discuss with Medicine and keep the patient n.p.o. past
midnight.
 
 
 
  <ELECTRONICALLY SIGNED>
   By: Dylan Simeon DPM          
  09/27/19     0716
D: 09/10/19 1837                           _____________________________________
T: 09/11/19 0216                           Dylan Simeon DPM            /nt

## 2019-09-27 NOTE — O
[facility]
Frannie Suarez
[place]   [address]                     OPERATIVE REPORT              
_______________________________________________________________________________
 
Name:       GAVINO NGUYEN                   Room #:         202-P       [facility] [place]  
M.R.#:      [id number]                       Account #:      [id number]  
Admission:  09/03/19    Attend Phys:    Ambrose Cha MD      
Discharge:  09/13/19    Date of Birth:  [birth year]  
                                                          Report #: [id number]
                                                                    [id number]   
_______________________________________________________________________________
THIS REPORT FOR:   //name//                          
 
CC: James Read
 
DATE OF SERVICE:  09/10/2019
 
 
SURGEON:  Dylan Simeon DPM
 
PREOPERATIVE DIAGNOSIS:  Osteomyelitis of residual right proximal phalangeal
base with deep tissue infection.
 
POSTOPERATIVE DIAGNOSIS:   Osteomyelitis of residual right proximal phalangeal
base with deep tissue infection.
 
PROCEDURES:
1.  Resection of the residual phalangeal base, right first MTP joint and the TP
joint.
2.  Resection of right distal first metatarsal and sesamoid.
3.  Incision and drainage, right foot.
4.  Skin flap, right foot with primary closure over Nu Gauze drain.
 
ANESTHESIA:  MAC.
 
INJECTABLES:  20 mL of 0.5% Marcaine plain.
 
HEMOSTASIS:  Right ankle pneumatic tourniquet at 250 mmHg.
 
ESTIMATED BLOOD LOSS:  Roughly 10 mL.
 
SUTURES:  3-0 nylon.
 
SPECIMENS:  Right distal first metatarsal and sesamoids, proximal phalangeal
base on right great toe.
 
CULTURES:
1.  Bone, phalangeal base, aerobic and anaerobic.
2.  Soft tissue, right foot, aerobic and anaerobic.
 
COMPLICATIONS:  None.
 
DESCRIPTION OF PROCEDURE:  The patient was brought to the OR and remained in her
medical bed.  MAC anesthesia was administered and a well-padded right ankle
 
 
 
[facility]
[address]
[place]   [address]                     OPERATIVE REPORT              
_______________________________________________________________________________
 
Name:       GAVINO NGUYEN                   Room #:         202-P       [facility] [place]  
M.R.#:      [id number]                       Account #:      [id number]  
Admission:  09/03/19    Attend Phys:    Ambrose Cha MD      
Discharge:  09/13/19    Date of Birth:  [birth year]  
                                                          Report #: [id number]
                                                                    [id number]   
_______________________________________________________________________________
tourniquet was placed.  A local anesthetic block was given with 0.5% Marcaine
plain for a total of 25 mL.  The foot was prepped and draped aseptically.  After
exsanguination and inflation of tourniquet, #10 scalpel was used to create a
dorsal linear incision over the distal first metatarsal with dissection down to
bone.  The proximal phalangeal base was lytic, discolored and appeared
nonviable.  A portion of this bone was sent for aerobic and anaerobic bone
cultures, and the remaining phalangeal base was sent for pathology.  The head of
the first metatarsal was slightly discolored, but no visible lysis.  I resected
the head of the first metatarsal at the neck and sent it for pathology as well. 
I then removed the tibial and fibular sesamoids which were also sent for
specimen.  I debrided the intraoperative wound tenderness of all tendons and
fibronecrotic tissue.  Electrocautery was utilized for hemostasis.  The skin
margins were remodeled to facilitate flap closure.  The plantar medial flap was
mobilized laterally and sutured with 3-0 nylon in simple interrupted fashion
with complete closure of the wound over a half inch Nu Gauze drain.  The foot
was cleansed and dried with a sterile bandage applied.  The tourniquet was
deflated with no complications or bleeding through the bandage.  The patient
left the OR with no pain or complications noted.
 
 
 
 
 
 
 
 
 
 
 
 
 
 
 
 
 
 
 
 
 
 
 
 
 
 
  <ELECTRONICALLY SIGNED>
   By: Dylan Simeon DPM          
  09/27/19     0716
D: 09/10/19 1853                           _____________________________________
T: 09/10/19 2049                           Dylan Simeon DPM            /nt

## 2019-09-27 NOTE — NUR
CASE DISCUSSED WITH ATTENDING DR AND ORTHO IS TO SEE PT TO SEEE IF ANY
ADDITIONAL SURGICAL INTERVENTION IS NEEDED.  FOLLOWING TO ASSIST WITH DC
PLANNING.

## 2019-09-27 NOTE — O
[facility]
Frannie Suarez
[place]   [address]                     OPERATIVE REPORT              
_______________________________________________________________________________
 
Name:       GAVINO NGUYEN                   Room #:         202-P       [facility] [place]  
M.R.#:      [id number]                       Account #:      [id number]  
Admission:  09/03/19    Attend Phys:    Ambrose Cha MD      
Discharge:  09/13/19    Date of Birth:  [birth year]  
                                                          Report #: [id number]
                                                                    [id number]   
_______________________________________________________________________________
THIS REPORT FOR:   //name//                          
 
CC: James Read
 
DATE OF SERVICE:  09/10/2019
 
 
PREOPERATIVE DIAGNOSIS:  Osteomyelitis, proximal phalanx of the great toe, right
foot.
 
POSTOPERATIVE DIAGNOSIS:  Osteomyelitis, proximal phalanx of the great toe,
right foot.
 
PROCEDURES:
1.  Resection residual proximal phalangeal base at right great toe joint.
2.  Resection right distal first metatarsal and tibial and fibular sesamoids.
3.  Incision and drainage, right foot.
4.  Skin flap, right foot with primary closure over drain.
 
ANESTHESIA:  MAC.
 
INJECTABLES:  25 mL of 0.5% Marcaine plain.
 
SUTURES:  3-0 nylon.
 
SPECIMENS:  Right distal first metatarsal, tibial and fibular sesamoids,
residual phalangeal base of the great toe.
 
CULTURES:
1.  Bone, proximal phalangeal base of the right great toe, aerobic and
anaerobic.
2.  Soft tissue, right foot, aerobic and anaerobic.
 
ESTIMATED BLOOD LOSS:  Minimal.
 
HEMOSTASIS:  Right ankle pneumatic tourniquet at 250 mmHg.
 
COMPLICATIONS:  None.
 
DESCRIPTION OF PROCEDURE:  The patient brought to the OR and left in her bed for
the procedure.  MAC anesthesia was administered.  A well-padded right ankle
pneumatic tourniquet was placed and a local anesthetic block was given to the
right foot.  After the foot was prepped and draped aseptically and exsanguinated
 
 
 
[facility]
[address] [place]   [address]                     OPERATIVE REPORT              
_______________________________________________________________________________
 
Name:       GAVINO NGUYEN                   Room #:         202-P       DIS [place]  
M.R.#:      [id number]                       Account #:      [id number]  
Admission:  09/03/19    Attend Phys:    Ambrose Cha MD      
Discharge:  09/13/19    Date of Birth:  [birth year]  
                                                          Report #: [id number]
                                                                    [id number]   
_______________________________________________________________________________
with inflation of the tourniquet, a #10 scalpel was used to create dorsal
incision over the distal first metatarsal with sharp dissection off the distal
first metatarsal and phalangeal base.  There was a residual portion of the
proximal phalangeal base still articulated with the distal first metatarsal. 
The bone was removed and discolored with lysis and visible necrosis.  A portion
was sent for bone culture and the remaining bone for surgical pathology.  The
distal first metatarsal head had some slight yellowish discoloration with no
catalina lysis.  I transected the distal head and sent it for pathology as well. 
The tibial and fibular sesamoids were then removed and also sent.  I debrided
the wound interior fibronecrotic tissue and tendons.  Electrocautery was used
for hemostasis.  The skin margins were remodeled to facilitate closure of the
skin flap.  The wound was flushed with sterile saline and dried.  A plantar
medial flap was mobilized laterally and sutured with 3-0 nylon in simple
interrupted fashion, complete closure of the wound was obtained and I used a
piece of half inch Nu Gauze as a drain between 2 sutures.  The foot was
cleansed, dried and dressed with fluffs, ABDs and Kerlix gauze.  The patient
left the OR with no pain or complications.  The tourniquet was deflated prior to
completion of the bandage change.  There is no active bleeding through the
bandage upon transfer to the PACU.
 
 
 
 
 
 
 
 
 
 
 
 
 
 
 
 
 
 
 
 
 
 
 
 
 
  <ELECTRONICALLY SIGNED>
   By: Dylan Simeon DPM          
  09/27/19     0716
D: 09/10/19 1859                           _____________________________________
T: 09/10/19 2110                           Dylan Simeon DPM            /nt

## 2019-09-27 NOTE — NUR
PT WAS STILL DROWSY FROM ANESTHESIA AT START OF SHIFT.PT'S BP AT START OF
SHIFT LOW,NP ON DUTY NOTIFIED,ORDER NOTED TO GIVE MIDODRINE.ORDER NOTED TO
ADVANCE PT'S DIET TO RENAL.PT ON 2L/NC.DRSG ON HER R FOOT C/D/I.PT RESTING ON
HER BED AT THIS TIME.CALL LIGHT WITHIN REACH.

## 2019-09-27 NOTE — NUR
Assumed pt care at 7am.Pt in bed sleeping till breakfast time.Assessment
completed.vss but low bp noted that was normal for pt.Midodrine given prior to
going to hemodialysis. Fentanyl ivp given during dialysis for rt foot pain.
Dr Verduzco here,order noted.Will continue to monitor.

## 2019-09-28 VITALS — DIASTOLIC BLOOD PRESSURE: 87 MMHG | SYSTOLIC BLOOD PRESSURE: 139 MMHG

## 2019-09-28 VITALS — DIASTOLIC BLOOD PRESSURE: 54 MMHG | SYSTOLIC BLOOD PRESSURE: 98 MMHG

## 2019-09-28 VITALS — SYSTOLIC BLOOD PRESSURE: 97 MMHG | DIASTOLIC BLOOD PRESSURE: 54 MMHG

## 2019-09-28 VITALS — SYSTOLIC BLOOD PRESSURE: 105 MMHG | DIASTOLIC BLOOD PRESSURE: 54 MMHG

## 2019-09-28 NOTE — NUR
Assumed pt care at 7am.Pt in bed resting. Repositioned q2h for comfort.
Assisted with tray setup at all meals.Fair appetite.Assessment completed.vss
but low bp noted.Midodrine given with am meds.Dr Proctor here,order noted.
Pt spouse hare later this afternoon,wanted more information from Dr Proctor
before taking pt to surgery in am.Answering service called and Dr Proctor
discussed with spouse and both agreed to have pt scheduled for tomorrow at
8am.Pain med given x2 for rt foot with relief.Will continue to monitor.

## 2019-09-28 NOTE — NUR
ASSESSMENT COMPLETED.DRSG ON HER R FOOT C/D/I WITH NO DRAINAGE.FEET ELEVATED
COLOSTOMY INTACT WITH GREENISH LOOSE STOOL IN THE BAG.PT A LITTLE TEARFUL AT
HS,EMOTIONAL SUPPORT GIVEN.PT RESTING COMFORTABLY ON HER BED AT THIS TIME.FALL
PRECAUTIONS IN PLACE,CALL LIGHT WITHIN REACH.

## 2019-09-29 VITALS — DIASTOLIC BLOOD PRESSURE: 57 MMHG | SYSTOLIC BLOOD PRESSURE: 112 MMHG

## 2019-09-29 VITALS — SYSTOLIC BLOOD PRESSURE: 114 MMHG | DIASTOLIC BLOOD PRESSURE: 57 MMHG

## 2019-09-29 VITALS — DIASTOLIC BLOOD PRESSURE: 62 MMHG | SYSTOLIC BLOOD PRESSURE: 113 MMHG

## 2019-09-29 VITALS — DIASTOLIC BLOOD PRESSURE: 70 MMHG | SYSTOLIC BLOOD PRESSURE: 121 MMHG

## 2019-09-29 VITALS — SYSTOLIC BLOOD PRESSURE: 125 MMHG | DIASTOLIC BLOOD PRESSURE: 64 MMHG

## 2019-09-29 VITALS — DIASTOLIC BLOOD PRESSURE: 65 MMHG | SYSTOLIC BLOOD PRESSURE: 125 MMHG

## 2019-09-29 VITALS — SYSTOLIC BLOOD PRESSURE: 101 MMHG | DIASTOLIC BLOOD PRESSURE: 48 MMHG

## 2019-09-29 VITALS — SYSTOLIC BLOOD PRESSURE: 105 MMHG | DIASTOLIC BLOOD PRESSURE: 58 MMHG

## 2019-09-29 LAB
%HYPO/RBC NFR BLD AUTO: (no result) %
ALBUMIN SERPL-MCNC: 2 G/DL (ref 3.4–5)
ANION GAP SERPL CALC-SCNC: 11 MMOL/L (ref 7–16)
ANISOCYTOSIS BLD QL SMEAR: (no result)
BASOPHILS NFR BLD AUTO: 0 % (ref 0–2)
BUN SERPL-MCNC: 30 MG/DL (ref 7–18)
BURR CELLS BLD QL SMEAR: (no result)
CALCIUM SERPL-MCNC: 8.5 MG/DL (ref 8.5–10.1)
CHLORIDE SERPL-SCNC: 104 MMOL/L (ref 98–107)
CO2 SERPL-SCNC: 24 MMOL/L (ref 21–32)
CREAT SERPL-MCNC: 4.6 MG/DL (ref 0.6–1)
EOSINOPHIL NFR BLD: 11 % (ref 0–3)
ERYTHROCYTE [DISTWIDTH] IN BLOOD BY AUTOMATED COUNT: 18.6 % (ref 10.5–14.5)
GLUCOSE SERPL-MCNC: 95 MG/DL (ref 74–106)
GRANULOCYTES NFR BLD MANUAL: 59 % (ref 36–66)
HCT VFR BLD CALC: 33.7 % (ref 37–47)
HGB BLD-MCNC: 10.3 GM/DL (ref 12–15)
LG PLATELETS BLD QL SMEAR: (no result)
LYMPHOCYTES NFR BLD AUTO: 20 % (ref 24–44)
MCH RBC QN AUTO: 24.7 PG (ref 26–34)
MCHC RBC AUTO-ENTMCNC: 30.6 G/DL (ref 28–37)
MCV RBC: 80.8 FL (ref 80–100)
MICROCYTES: (no result)
MONOCYTES NFR BLD: 9 % (ref 1–8)
NEUTROPHILS # BLD: 6.2 THOU/UL (ref 1.4–8.2)
NEUTS BAND NFR BLD: 1 % (ref 0–8)
PHOSPHATE SERPL-MCNC: 5.2 MG/DL (ref 2.5–4.9)
PLATELET # BLD: 300 THOU/UL (ref 150–400)
POTASSIUM SERPL-SCNC: 3.2 MMOL/L (ref 3.5–5.1)
RBC # BLD AUTO: 4.18 MIL/UL (ref 4.2–5)
SCHISTOCYTES BLD QL SMEAR: (no result)
SODIUM SERPL-SCNC: 139 MMOL/L (ref 136–145)
WBC # BLD AUTO: 10.3 THOU/UL (ref 4–11)

## 2019-09-29 NOTE — NUR
ASSESSMENT COMPLETED. PT ALERT AND ORIENTED. PAIN TO RLE. ORAL AND IV PAIN
MEDS GIVEN. PT ALSO GETS RELIEF BY DANGLING FOOT. PT IS NPO AFTER MIDNOC FOR
BKA IN THE AM. AFEBRILE. DENIES ANY N/V.COMPLETE BATH GIVEN. CONTINUES ON IV
ABTS.PT IS ANURIC. COLOSTOMY WITH GOOD OUTPUT.ASSITED WITH REPOSITIONING. CALL
LIGHT WITHIN REACH. WILL CONTINUE WITH POC TILL EOS.

## 2019-09-29 NOTE — NUR
PT ASSESSED AT START OF SHIFT. WENT TO SURGERY FOR RT BKA. 350ML BLOOD LOSS
AND HAS ONE UNIT BLOOD ON RESERVE IN CASE  ORDERS LATER IF HGB DROPS. RT
STUMP DSNG DRY AND INTACT W/ ACE WRAP. HEMOVAC DRAIN IN PLACE - NO DRAINAGE.
PT W/ INTERMITTENT PAINS. IV FENTANYL W/ OXY GIVEN FOR BREAKTROUGH. PT DOSING
IN BETWEEN BUT HAS INTERMITTENT SHOOTING PAIN AT TIMES. ATE WELL FOR DINNER.

## 2019-09-30 VITALS — DIASTOLIC BLOOD PRESSURE: 82 MMHG | SYSTOLIC BLOOD PRESSURE: 111 MMHG

## 2019-09-30 VITALS — SYSTOLIC BLOOD PRESSURE: 109 MMHG | DIASTOLIC BLOOD PRESSURE: 61 MMHG

## 2019-09-30 VITALS — SYSTOLIC BLOOD PRESSURE: 143 MMHG | DIASTOLIC BLOOD PRESSURE: 95 MMHG

## 2019-09-30 VITALS — DIASTOLIC BLOOD PRESSURE: 53 MMHG | SYSTOLIC BLOOD PRESSURE: 108 MMHG

## 2019-09-30 LAB
ANION GAP SERPL CALC-SCNC: 14 MMOL/L (ref 7–16)
CHLORIDE SERPL-SCNC: 108 MMOL/L (ref 98–107)
CO2 SERPL-SCNC: 20 MMOL/L (ref 21–32)
HCT VFR BLD CALC: 23.2 % (ref 37–47)
HGB BLD-MCNC: 7.1 GM/DL (ref 12–15)
POTASSIUM SERPL-SCNC: 3.6 MMOL/L (ref 3.5–5.1)
SODIUM SERPL-SCNC: 142 MMOL/L (ref 136–145)

## 2019-09-30 NOTE — NUR
ASSUMED CARE OF PT @1900 PT ASSESSED AT START OF SHIFT WITH C/O OF RIGHT STUMP
PAIN. PAIN MEDS GIVEN SEE EMAR. DRESSING INTACT ON RT STUMP AND NO DRAINGE ON
HEMOVAC. COLOSTOMY INTACT AND LIQUID STOOL NOTED. PT REPOSITIONED FOR COMFORT.
ABX INFUSING. CALLS AND LET NEEDS KNOWN WILL CONT WITH POC TILL EOS

## 2019-09-30 NOTE — NUR
PATIENT SEEN BY DR. DANIELLE THIS DATE. PATIENT MAY BE A CANDIDATE FOR ACUTE
REHAB. DR. DANIELLE REORDERED THERAPY AND PT/OT TO ATTEMPT TO SEE PATIENT THIS
AFTERNOON. WILL NEED TO SEE HOW PATIENT TOLERATES THERAPY. IF APPROPRIATE AND
PATIENT WOULD LIKE TO COME TO [facility] WILL SEEK AUTHORIZATION.
 
THANK YOU FOR THIS REFERRAL.

## 2019-09-30 NOTE — NUR
ASSUMED CARE AT 0700, SHIFT ASSESSMENT DONE, HAD DIALYSIS DONE TODAY, TOOK 0.7
L OFF. REPORTED SEVERE PAIN, PRN PAIN MEDS GIVEN WITH SOME RELIEF. RIGHT BKA
DRESSING CHANGE DONE BY WOUND CARE TEAM. RECEIVING IV ANTIBIOTICS. WILL
CONTINUE TO ASSESS AND ASSIST WITH ADLs AS NEEDED.

## 2019-09-30 NOTE — NUR
Pt WENT TO OR FOR R BKA YESTERDAY WITH ORTHO. WILL NEED UPDATED PT ORDERS TO
RESUME PT INTERVENTIONS WHEN Pt MEDICALLY APPROPRIATE TO PARTICIPATE WITH
THERAPY SERVICES.

## 2019-10-01 VITALS — DIASTOLIC BLOOD PRESSURE: 64 MMHG | SYSTOLIC BLOOD PRESSURE: 112 MMHG

## 2019-10-01 VITALS — DIASTOLIC BLOOD PRESSURE: 56 MMHG | SYSTOLIC BLOOD PRESSURE: 98 MMHG

## 2019-10-01 VITALS — SYSTOLIC BLOOD PRESSURE: 106 MMHG | DIASTOLIC BLOOD PRESSURE: 61 MMHG

## 2019-10-01 VITALS — SYSTOLIC BLOOD PRESSURE: 114 MMHG | DIASTOLIC BLOOD PRESSURE: 59 MMHG

## 2019-10-01 VITALS — SYSTOLIC BLOOD PRESSURE: 99 MMHG | DIASTOLIC BLOOD PRESSURE: 57 MMHG

## 2019-10-01 NOTE — NUR
Assumed patient care at 0715. Patients vital signs are stable. Dressing change
completed as ordered,  was present with with new "stump shrinkers."
Patient has struggled with pain to right lower extremity. New order received
for Morphine IV push 4mg; this has been efecctive. POC followed. Appetite
remains excellent. O2 at 2 liters per nc.

## 2019-10-01 NOTE — HC
[facility]
Frannie Suarez
[place]   [address]                     CONSULTATION                  
_______________________________________________________________________________
 
Name:       GAVINO NGUYEN                   Room #:         421-P       ADM [place]  
M.R.#:      [id number]                       Account #:      [id number]  
Admission:  09/24/19    Attend Phys:    Ambrose Cha MD      
Discharge:              Date of Birth:  [birth year]  
                                                          Report #: [id number]
                                                                    [id number]   
_______________________________________________________________________________
THIS REPORT FOR:   //name//                          
 
CC: James Merritthens
 
DATE OF SERVICE:  09/25/2019
 
 
REASON FOR CONSULTATION:  End-stage renal disease.
 
REASON FOR PRESENTATION:  Shortness of breath.
 
HISTORY OF PRESENT ILLNESS:  A well-known patient to me.  She is in end-stage
renal disease, maintained on hemodialysis every Monday, Wednesday and Friday. 
She had been hospitalized in the early part of September for lower extremity
wound.  She dialyzes with another facility.  She has been battling with right
foot wound and was admitted for evaluation of her nonhealing wound.  She was
evaluated in the Wound Clinic yesterday and decision was made to admit the
patient for further evaluation.
 
PAST MEDICAL HISTORY:  Extensive and includes:
1.  End-stage renal disease.
2.  Peripheral vascular disease.
3.  Osteomyelitis.
4.  Chronic hypotension.
5.  Right great toe amputation.
6.  Left below-knee amputation.
7.  Crohn's disease.
8.  Colectomy.
9.  Ileostomy.
 
MEDICATIONS:
1.  Renvela.
2.  Nephro-Pratima.
3.  Sulfasalazine.
4.  Neurontin.
5.  Plavix.
 
ALLERGIES:  CODEINE.
 
FAMILY HISTORY:  Negative for renal disease.
 
SOCIAL HISTORY:  .  No drug or alcohol abuse.
 
REVIEW OF SYSTEMS:
 
 
 
[facility]
[address]
[place]   [address]                     CONSULTATION                  
_______________________________________________________________________________
 
Name:       GAVINO NGUYEN                   Room #:         421-P       ADM [place]  
M.R.#:      [id number]                       Account #:      [id number]  
Admission:  09/24/19    Attend Phys:    Ambrose Cha MD      
Discharge:              Date of Birth:  [birth year]  
                                                          Report #: [id number]
                                                                    [id number]   
_______________________________________________________________________________
GENERAL:  Significant for fatigue and weakness.
CARDIOVASCULAR:  No chest pain or palpitation.
NECK:  Supple.
PULMONARY:  No cough or hemoptysis.
GASTROINTESTINAL:  No nausea or vomiting.
GENITOURINARY:  She makes no urine.
MUSCULOSKELETAL:  As per history of present illness.
 
PHYSICAL EXAMINATION:
GENERAL:  She is afebrile, blood pressure 94/45.
HEAD AND NECK:  No jugular venous distention.
CHEST:  No crackles.
CARDIOVASCULAR:  No rub.
ABDOMEN:  Ileostomy.
LOWER EXTREMITIES:  Left below knee amputation.  Right big toe amputation, with
some erythema and cellulitic changes.
 
LABORATORY DATA:  Revealed a white blood cell count of 15.3.  Sodium is 135, BUN
is 17, creatinine is 4.6.  Albumin is 2.8.
 
IMPRESSION AND PLAN:
1.  End-stage renal disease.
2.  Ongoing right foot wound, osteo, cellulitis.
3.  Atrial fibrillation.
4.  Chronic hypertension.
5.  Hemodialysis will be arranged for the patient today.
6.  Defer the management of her ongoing wound issues, osteomyelitis to the
admitting team.
 
 
 
 
 
 
 
 
 
 
 
 
 
 
 
 
  <ELECTRONICALLY SIGNED>
   By: Karina Ovalle MD          
  10/01/19     0746
D: 09/25/19 0917                           _____________________________________
T: 09/25/19 2139                           Karina Ovalle MD            /nt

## 2019-10-01 NOTE — NUR
ASSUMED CARE OF PT @1900 PT ASSESSED AT START OF SHIFT WITH C/O OF PAIN IN RT
STUMP PAIN MEDS GIVEN SEE EMAR. DRESSING INTACT IN RT STUMP. PT REPOSITIONED
FOR COMFORT AND PILLOWS PLACED UNDERNEATH STUMP.  WILL CONT WITH POC TILL EOS.

## 2019-10-01 NOTE — NUR
INFORMED BY REHAB LIASION THAT 5N WAS DENIED BY INS.  ASKED DC PLANNER TO FAX
UPDATE TO [facility] AND TO HVE THEM SEEK AUTHORIZATION ASAP.

## 2019-10-01 NOTE — NUR
RECEIVED CALL STANLEY SHIN REPRESENTATIVE. AUTHORIZATION DENIED. MEDICAL DIRECTOR
FELT PATIENT WAS MORE APPROPRIATE FOR SKILLED AND WOULD BE UNABLE TO TOLERATE
3 HOURS OF REHAB A DAY.  PEER TO PEER CAN BE COMPLETED. NUMBER TO CALL IS
[phone]. THIS MUST BE COMPLETED BY 10/2/19 AT NOON. REFERENCE NUMBER IS
[id number].  INFORMED.

## 2019-10-02 VITALS — DIASTOLIC BLOOD PRESSURE: 50 MMHG | SYSTOLIC BLOOD PRESSURE: 96 MMHG

## 2019-10-02 VITALS — DIASTOLIC BLOOD PRESSURE: 54 MMHG | SYSTOLIC BLOOD PRESSURE: 114 MMHG

## 2019-10-02 VITALS — DIASTOLIC BLOOD PRESSURE: 63 MMHG | SYSTOLIC BLOOD PRESSURE: 114 MMHG

## 2019-10-02 VITALS — SYSTOLIC BLOOD PRESSURE: 94 MMHG | DIASTOLIC BLOOD PRESSURE: 46 MMHG

## 2019-10-02 LAB
HCT VFR BLD CALC: 26.2 % (ref 37–47)
HGB BLD-MCNC: 8.3 GM/DL (ref 12–15)

## 2019-10-02 NOTE — NUR
FAXED CLINICAL UPDATE TO [facility] SPOKE WITH LARS IN INTAKE AND RECEIVED
CONFIRMATION SHE WILL SUBMIT FOR AUTH. DP TO FOLLOW.

## 2019-10-02 NOTE — NUR
ASSUMED CARE OF PT AT 0700. PT WENT TO DIALYSIS  FLUID WAS REMOVED.
WOUND CHANGE WAS DONE BY NOC NURSE DUE TO PTS PAIN. STUMP  WAS NOT
APPLIED. COLOSTOMY WAS CHANGED DUE TO LEAKAGE. PTS PAIN WAS CONTROLLED BY PAIN
MEDICATIONS. VIEWED PTS RIGHT BUTTOCK AND NO OPEN WOUNDS. INSERTED PTS VAGINAL
CREAM. PT TURNED Q2. FALL PRECAUTIONS IN PLACE. BED IN LOWEST POSITION. CALL
LIGHT WITHIN REACH. WILL MONITOR PT AND GIVE REPORT TO NOC NURSE.

## 2019-10-02 NOTE — NUR
PT WAS REASS.VSS WAS WNL.DRESSING DRY AND CLEAN .PATIENT COMPLAIN OF PAIN AND
WAS ADMINISTEDRED OXYCODONE WITH RELIEF.PT REPOSITIONED AND ZGUARD ADM ON
BOTTOM.

## 2019-10-02 NOTE — PATH
[facility]
[address]
[place]   [address]                     PATHOLOGY RPT PROCEDURE       
_______________________________________________________________________________
 
Name:       GAVINO NGUYEN                   Room #:         421-P       ADM [place]  
M.R.#:      [id number]     Account #:      [id number]  
Admission:  09/24/19    Date of Birth:  [birth year]  
Discharge:                                              Report #:    [id number]
                                                        Path Case #: [id number]
_______________________________________________________________________________
 
LCA Accession Number: [id number]
.                                                                01
Material submitted:                                        .
knee - RIGHT BELOW THE KNEE AMPUTATION. Modifiers: right
.                                                                01
Clinical history:                                          .
Ischemic foot and osteomyelitis
.                                                                02
**********************************************************************
Diagnosis:
Leg, right, below the knee amputation:
- Skin and subcutaneous tissue showing marked ulceration along with a
fibrinoid degeneration as well as gangrenous necrosis.
- Acute inflammation extends into underlying bone associated with and
chronic osteomyelitis as well as osteonecrosis.
- Vessels showing calcific atherosclerosis with complete luminal
narrowing.
- Bone at surgical margin viable and unremarkable.
- Skin at surgical margin viable.
(IUV:ileana; 10/01/2019)
MBR  10/01/2019  1358 Local
**********************************************************************
.                                                                02
Electronically signed:                                     .
Manuela Shipley MD, Pathologist
NPI- [id number]
.                                                                01
Gross description:                                         .
Received fresh in a red biohazard bag labeled "Gavino Nguyen, right below
knee amputation" and consists of a right below the knee amputation
specimen measuring 27.0 cm heel to proximal skin soft tissue margin and
22.3 cm heel to middle toe.  The first toe is absent with a linear
previous excision site closed with sutures measuring 7.6 cm.  The
remaining 4 toes show nails which are thickened, yellow-brown and
irregular.  The second and third toe are purple-pink and ischemic.
Surrounding the previous excision, the skin is tan-brown and necrotic.
The rest of the skin is tan-brown with no additional lesions.  Protruding
from the proximal margin is the fibula (8.8 cm in length and 1.2 cm
diameter) and tibia (5.5 cm in length and 4.0 cm diameter).  Both the skin
soft tissue margin and bone margins appear grossly viable.  The anterior
and posterior tibial arteries display partially stenotic lumens.
Representative sections are submitted as follows:
.
A1: Skin soft tissue margin
A2: Previous excision skin and second toe skin
A3: Bone deep to previous excision
A4: Anterior and posterior tibial arteries and dorsalis pedis
 
 
[place] [address]                     PATHOLOGY RPT PROCEDURE       
_______________________________________________________________________________
 
Name:       GAVINO NGUYEN                   Room #:         421-P       ADM IN  
M.R.#:      [id number]     Account #:      [id number]  
Admission:  09/24/19    Date of Birth:  [birth year]  
Discharge:                                              Report #:    [id number]
                                                        Path Case #: [id number]
_______________________________________________________________________________
A5: Proximal bone marrow
Cassettes A3-A5 will be submitted following decalcification
(SDY; 9/30/2019)
SYU/SYU  09/30/2019  1325 Local
.                                                                02
Pathologist provided ICD-10:
L97.919, I96, M86.60, M87.9, I70.201
.                                                                02
CPT                                                        .
859417, 724050
Specimen Comment: A courtesy copy of this report has been sent to
Specimen Comment: [phone], [phone], [phone], [phone].
Specimen Comment: Report sent to ,DR KAHN,DR PACHECO / DR BOGGS
Specimen Comment: Report sent to 
***Performed at:  01
   [facility] [address], [place], [place]  [id number]
   MD Dejan Cardoso MD Phone:  [phone]
***Performed at:  02
   [facility] [address]  [id number]
   MD Manuela Shipley MD Phone:  [phone]

## 2019-10-03 VITALS — SYSTOLIC BLOOD PRESSURE: 93 MMHG | DIASTOLIC BLOOD PRESSURE: 57 MMHG

## 2019-10-03 VITALS — SYSTOLIC BLOOD PRESSURE: 98 MMHG | DIASTOLIC BLOOD PRESSURE: 52 MMHG

## 2019-10-03 VITALS — DIASTOLIC BLOOD PRESSURE: 66 MMHG | SYSTOLIC BLOOD PRESSURE: 105 MMHG

## 2019-10-03 VITALS — DIASTOLIC BLOOD PRESSURE: 50 MMHG | SYSTOLIC BLOOD PRESSURE: 99 MMHG

## 2019-10-03 NOTE — HC
[facility]
Frannie Suarez
[place]   [address]                     CONSULTATION                  
_______________________________________________________________________________
 
Name:       GAVINO NGUYEN                   Room #:         437-P       ADM IN  
M.R.#:      [id number]                       Account #:      [id number]  
Admission:  09/24/19    Attend Phys:    Ambrose Cha MD      
Discharge:              Date of Birth:  [birth year]  
                                                          Report #: [id number]
                                                                    [id number]   
_______________________________________________________________________________
THIS REPORT FOR:   //name//                          
 
CC: James Merritthens
 
DATE OF SERVICE:  09/30/2019
 
 
HISTORY OF PRESENT ILLNESS:  The patient is a 74-year-old -American
female who was originally admitted on 09/24/2019 with severe peripheral vascular
disease, wound, right foot.  She had had a prior right great toe amputation,
09/02/2019.  The patient has an old left below-knee amputation.  With her right
foot worsening, she underwent further evaluation, was noted to have
osteomyelitis and secondary gangrene and ended up undergoing a matching right
below-knee amputation on 09/29/2019.  She is on IV antibiotics with Infectious
Disease involved.  She currently has the drain in place, she is being monitored
for postoperative acute blood loss anemia.  She has multiple medical
comorbidities as noted below.  We are following her in rehabilitation medicine
consultation.
 
PAST MEDICAL HISTORY:  Includes end-stage renal disease, on hemodialysis;
chronic hypotension.  She has Crohn's disease and is status post colectomy and
colostomy.  She has a history of severe protein-calorie malnutrition, paroxysmal
atrial fibrillation.
 
MEDICATIONS:  Please see the full medication listing.
 
ALLERGIES:  CODEINE.
 
HABITS:  No history of alcohol abuse.  She is noted to be a former smoker.
 
FAMILY HISTORY:  Noncontributory.
 
SOCIAL HISTORY:  Lives with her , house ramped, used a wheelchair, was
not on O2.  She utilized a sliding board.   apparently helped some with
sliding board transfers.   did the instrumental ADLs.  She does have a
prosthesis, but had not been using as she has had skin breakdown problems with
the old below knee amputation.  She follows with [facility] per her history. 
She notes that the wound area has healed.
 
REVIEW OF SYSTEMS:  No current complaints of chest pain, shortness of breath,
abdominal discomfort.  No headache.  She notes overall generalized fatigue.  No
noted fever or chills.  She has discomfort as expected involving the below-knee
amputation.  She has the prior colostomy.  No change noted with bladder issues
or bowel issues.
 
 
 
[facility]
[address] [place]   [address]                     CONSULTATION                  
_______________________________________________________________________________
 
Name:       GAVINO NGUYEN                   Room #:         437-P       [facility] [place]  
M.R.#:      [id number]                       Account #:      [id number]  
Admission:  09/24/19    Attend Phys:    Ambrose Cha MD      
Discharge:              Date of Birth:  [birth year]  
                                                          Report #: [id number]
                                                                    [id number]   
_______________________________________________________________________________
 
PHYSICAL EXAMINATION:
GENERAL:  A 74-year-old -American female, small statured, thin, no
obvious distress.  She is alert, soft spoken.
VITAL SIGNS:  Temperature 98.0, pulse 94, respirations 18, blood pressure
109/61.  She is on nasal prong O2.
HEENT:  Facies are symmetric.
EXTREMITIES:  She has functional range of motion of the upper extremity,
strength is probably a grade 3+ to 4-/5.  DTRs are trace to 1.  She has the
ostomy in place on examination of her abdomen.  On examining lower extremity,
she does have the old left below knee amputation.  She has good range of motion
at the knee and the residual limb is without breakdown.  Her right below knee
amputation is dressed and she has the drain in place.  Strength of that left
lower extremity is probably a grade 4- to 3+/5.  I was not able to adequately
assess the right lower extremity with the dressing in place.
 
ASSESSMENT:  A 74-year-old female with the following problem list:
1.  Severe peripheral vascular disease with osteomyelitis and secondary
gangrene, now status post right below-knee amputation, 09/29/2019.
2.  Prior left below knee amputation.
3.  End-stage renal disease, on hemodialysis.
4.  Crohn's disease, status post colectomy with colostomy.
5.  Chronic hypotension.
6.  Paroxysmal atrial fibrillation.
7.  Protein-calorie malnutrition.
 
PLAN:  Therapy evaluations postoperatively I ordered.  I asked the patient to
have her  bring in her old left below knee amputation prosthesis.  This
may help with basic transfers including sliding board transfers, which she was
doing before with her 's assistance.  She does have support at home and
she certainly may benefit from an acute in-hospital inpatient rehabilitation
stay as she further medically stabilizes.  We will have the therapist work with
her and we will be glad to follow along with you regarding her rehab therapy
needs.
 
 
 
 
 
 
 
 
 
 
  <ELECTRONICALLY SIGNED>
   By: Tremayne Burris MD         
  10/03/19     1104
D: 09/30/19 1409                           _____________________________________
T: 09/30/19 2411                           Tremayne Burris MD           /[facility]

## 2019-10-03 NOTE — NUR
PT IS O/A X4.PT IS REPOSITIONING Q2.ZGUARD APPLID TO BOTTOM AND PAIN
MEDICATION ADM (MORPHINE).PT DRESSING DRY AND CLEAN.PT WAS ORDEDERD FOOT
IMMOBILISER FOR RLE BUT STAFFS UNABLE TO PUT IN PLACE.PT APPETITE IS
EXCELLENT.TO CONTINUE POC TILL EOS.

## 2019-10-03 NOTE — NUR
ASSUMMED CARE OF PT AT 0700. Q2 CHANGE TO LEFT AND RIGHT SIDE. DRESSING CHANGE
OF THE R KNEE WITH NS, AQUACEL, ABD, KERLIX AND ABD. WOUND ON PTS BACK WAS
INTACT WITH NO BLEEDING. PAIN CONTROL WITH MEDS AND REPOSITIONING. EMPTIED
OSTOMY. ANTIBIOTICS WERE DISCONTINUED PER MD. FALL PRECAUTIONS IN PLACE. CALL
LIGHT WITHIN REACH AND BED IN LOWEST POSITION. WILL CONTINUE TO MONITOR PT.

## 2019-10-04 VITALS — SYSTOLIC BLOOD PRESSURE: 122 MMHG | DIASTOLIC BLOOD PRESSURE: 60 MMHG

## 2019-10-04 VITALS — SYSTOLIC BLOOD PRESSURE: 89 MMHG | DIASTOLIC BLOOD PRESSURE: 43 MMHG

## 2019-10-04 VITALS — SYSTOLIC BLOOD PRESSURE: 116 MMHG | DIASTOLIC BLOOD PRESSURE: 80 MMHG

## 2019-10-04 LAB
ERYTHROCYTE [DISTWIDTH] IN BLOOD BY AUTOMATED COUNT: 17.8 % (ref 10.5–14.5)
HCT VFR BLD CALC: 22.5 % (ref 37–47)
HGB BLD-MCNC: 7.1 GM/DL (ref 12–15)
MCH RBC QN AUTO: 25.6 PG (ref 26–34)
MCHC RBC AUTO-ENTMCNC: 31.5 G/DL (ref 28–37)
MCV RBC: 81.3 FL (ref 80–100)
PLATELET # BLD: 226 THOU/UL (ref 150–400)
RBC # BLD AUTO: 2.77 MIL/UL (ref 4.2–5)
WBC # BLD AUTO: 7.4 THOU/UL (ref 4–11)

## 2019-10-04 NOTE — NUR
ASSUMED CARE OF PATIENT AT 0715, PATIENT ALERT AND ORIENTED. PATIENT C/O PAIN
WITH RIGHT BKA, MORPHINE 4 MG IV GIVEN X 2 THIS SHIFT. OXYCODONE 1 TABLET
GIVEN AND FLEXIRIL 10 MG GIVEN THIS SHIFT. PATIENT HAS LEFT HAND IV IN PLACE.
PATIENT HAD DIALYSIS TODAY 500CC OFF. BLISTER NOTED AND REPORTED TO WOUND
CARE. OSTOMY NURSE CHANGE COLSOTOMY BAG THIS AM. PATIENT REFUSED TO BE TRUNED
DUE TO PAIN WITH RIGHT BKA. DISCHARGE ORDER IN FOR FACILITY, BUT ON HOLD DUE
TO HEMG 7.1, MAY DISCHARGE TOMORROW.  WAS AT BEDSIDE THIS AFTERNOON.
PATIENTREFUSED DINNER, AND ATE VERY SMALL AMT TODAY. WILL CONTINUE TO MONITOR.

## 2019-10-04 NOTE — NUR
OSTOMY CARE NOTE;
UNSURE HOW LONG CURRENT POUCH ON? PT STATES SEVERAL DAYS, CHANGED USING 2
PIECE SYSTEM JUANA CUT TO FIT, STOMA PINK VIABLE BUDDED W/ SMALL AMT BROWN
LOOSE STOOL, PERISTOMAL SKIN INTACT, COOPERATIVE W/ CARE, SUPPLIES PLACED AT
, STAFF RN INFORMED OF CARE

## 2019-10-04 NOTE — NUR
ASSUMED PT CARE AT 1915 ON 10/3/19. PT WAS ALERT WHEN INTRODCED TO
ONCOMING STAFF. PT SHOWS IS WEAK. FALL PERCAUTION IN PLACE, BED IN
LOWEST POSTION. CALL LIGHT IN REACH. SKIN INTACT BUT BLANCHABLE ON BUTTOCK,
BLISTER NOTED ON RIGHT KNEE STILL INTACT. OSTOMY EMPTIED. PT REPOSITIONED
AS NEEDED. PT DENIES PAIN. PT HAS POOR APPETITE. TEMPERATURE WAS
101.2. TYTENOL WAS ADMINISTERED, EFFECTIVE. PT LOOKING FORWARD TO BE
DISCHARGED LATER IN THE DAY.

## 2019-10-05 VITALS — DIASTOLIC BLOOD PRESSURE: 55 MMHG | SYSTOLIC BLOOD PRESSURE: 87 MMHG

## 2019-10-05 VITALS — DIASTOLIC BLOOD PRESSURE: 60 MMHG | SYSTOLIC BLOOD PRESSURE: 109 MMHG

## 2019-10-05 VITALS — DIASTOLIC BLOOD PRESSURE: 58 MMHG | SYSTOLIC BLOOD PRESSURE: 101 MMHG

## 2019-10-05 VITALS — SYSTOLIC BLOOD PRESSURE: 102 MMHG | DIASTOLIC BLOOD PRESSURE: 59 MMHG

## 2019-10-05 LAB
ERYTHROCYTE [DISTWIDTH] IN BLOOD BY AUTOMATED COUNT: 17.7 % (ref 10.5–14.5)
HCT VFR BLD CALC: 27.3 % (ref 37–47)
HGB BLD-MCNC: 8.4 GM/DL (ref 12–15)
MCH RBC QN AUTO: 25.3 PG (ref 26–34)
MCHC RBC AUTO-ENTMCNC: 30.7 G/DL (ref 28–37)
MCV RBC: 82.5 FL (ref 80–100)
PLATELET # BLD: 315 THOU/UL (ref 150–400)
RBC # BLD AUTO: 3.31 MIL/UL (ref 4.2–5)
WBC # BLD AUTO: 8 THOU/UL (ref 4–11)

## 2019-10-05 NOTE — NUR
ASSUMED PT CARE AT 1915 ON 10/4/19. PT GRIMACES AND CRIES WHEN REPOSITIONED.
PT REPOSITIONED WHEN NECCESSARY. BARRIER CREAM APPLIED. OSTOMY EMPTIED. NO
DRAINAGE ON DRESSING RBKA INSCISION. DRESSING CHANGE DONE DURING SHIFT. FALL
PRECAUTION IN PLACE. BED IN LOWEST POSITION. CALL LIGHT WITHIN REACH. PT
LOOKING FORWARD TO DISCHARGE.

## 2019-10-05 NOTE — NUR
PT A&OX2-S. IV INTACT IN L HAND, DIALYSIS CATH INTACT IN R CHEST. R BKA DRSG
CHANGED INCISION IS C/D/I WITH STAPLES AND SUTURES. THERE IS A BLISTER ON THE
RIGHT KNEE, PT'S SPOUSE REFUSES STUMP  TO BE PLACED AT THIS TIME.
COLOSTOMY INTACT. WILL CONT POC

## 2019-10-06 VITALS — SYSTOLIC BLOOD PRESSURE: 90 MMHG | DIASTOLIC BLOOD PRESSURE: 54 MMHG

## 2019-10-06 VITALS — SYSTOLIC BLOOD PRESSURE: 72 MMHG | DIASTOLIC BLOOD PRESSURE: 77 MMHG

## 2019-10-06 VITALS — DIASTOLIC BLOOD PRESSURE: 53 MMHG | SYSTOLIC BLOOD PRESSURE: 90 MMHG

## 2019-10-06 VITALS — DIASTOLIC BLOOD PRESSURE: 54 MMHG | SYSTOLIC BLOOD PRESSURE: 90 MMHG

## 2019-10-06 VITALS — DIASTOLIC BLOOD PRESSURE: 58 MMHG | SYSTOLIC BLOOD PRESSURE: 91 MMHG

## 2019-10-06 LAB
ERYTHROCYTE [DISTWIDTH] IN BLOOD BY AUTOMATED COUNT: 17.5 % (ref 10.5–14.5)
HCT VFR BLD CALC: 23 % (ref 37–47)
HGB BLD-MCNC: 7.5 GM/DL (ref 12–15)
MCH RBC QN AUTO: 26.4 PG (ref 26–34)
MCHC RBC AUTO-ENTMCNC: 32.5 G/DL (ref 28–37)
MCV RBC: 81.3 FL (ref 80–100)
PLATELET # BLD: 290 THOU/UL (ref 150–400)
RBC # BLD AUTO: 2.82 MIL/UL (ref 4.2–5)
WBC # BLD AUTO: 7.2 THOU/UL (ref 4–11)

## 2019-10-06 NOTE — NUR
PATIENT ALERT AND ORIENTED X4.  DENIES PAIN.  DRESSING ON RBKA INTACT.  NO
SIGN OF DRAINAGE.  SLEPT MOST OF THE NIGHT.

## 2019-10-06 NOTE — NUR
PT ASSESSED AT START OF SHIFT. PAIN CONTROL BETTER BUT STILL HAS INTERMITTENT
PHANTOM SHOOTING PAIN. PT TURNED Q2HRS. NAPPED SOME THIS AFTERNOON. TEMP 98.9
AXILLARY. CXR RESULTS CALLED TO DR. LINDQUIST.ORDERS NOTED. DR. GAXIOLA CALLED TO
CHECK ON PT RE RECONSULT FOR COUGH AND FEVER. ID WILL SEE TOMORROW. TEMP
BETTER. HAS LOOSE NONPRODUCTIVE COUGH. WILL DIALYZE IN THE AM.

## 2019-10-07 VITALS — SYSTOLIC BLOOD PRESSURE: 81 MMHG | DIASTOLIC BLOOD PRESSURE: 46 MMHG

## 2019-10-07 VITALS — DIASTOLIC BLOOD PRESSURE: 46 MMHG | SYSTOLIC BLOOD PRESSURE: 92 MMHG

## 2019-10-07 VITALS — SYSTOLIC BLOOD PRESSURE: 94 MMHG | DIASTOLIC BLOOD PRESSURE: 56 MMHG

## 2019-10-07 VITALS — DIASTOLIC BLOOD PRESSURE: 57 MMHG | SYSTOLIC BLOOD PRESSURE: 91 MMHG

## 2019-10-07 LAB
ALBUMIN SERPL-MCNC: 1.9 G/DL (ref 3.4–5)
ANION GAP SERPL CALC-SCNC: 10 MMOL/L (ref 7–16)
BUN SERPL-MCNC: 40 MG/DL (ref 7–18)
CALCIUM SERPL-MCNC: 9.1 MG/DL (ref 8.5–10.1)
CHLORIDE SERPL-SCNC: 97 MMOL/L (ref 98–107)
CO2 SERPL-SCNC: 27 MMOL/L (ref 21–32)
CREAT SERPL-MCNC: 6.2 MG/DL (ref 0.6–1)
ERYTHROCYTE [DISTWIDTH] IN BLOOD BY AUTOMATED COUNT: 17.4 % (ref 10.5–14.5)
GLUCOSE SERPL-MCNC: 89 MG/DL (ref 74–106)
HCT VFR BLD CALC: 24.1 % (ref 37–47)
HGB BLD-MCNC: 7.4 GM/DL (ref 12–15)
MCH RBC QN AUTO: 25.3 PG (ref 26–34)
MCHC RBC AUTO-ENTMCNC: 30.9 G/DL (ref 28–37)
MCV RBC: 82.1 FL (ref 80–100)
PHOSPHATE SERPL-MCNC: 5.7 MG/DL (ref 2.5–4.9)
PLATELET # BLD: 308 THOU/UL (ref 150–400)
POTASSIUM SERPL-SCNC: 3.3 MMOL/L (ref 3.5–5.1)
RBC # BLD AUTO: 2.93 MIL/UL (ref 4.2–5)
SODIUM SERPL-SCNC: 134 MMOL/L (ref 136–145)
WBC # BLD AUTO: 8.5 THOU/UL (ref 4–11)

## 2019-10-07 NOTE — NUR
Followup: pt with new right BKA on 9/29, and prior left BKA few years ago.
Dialysis pt.  Eating variable amounts %.  Visit during lunch today,
still eating and drank 100% of nepro drink=425 gareth and 20g protein.  Food
preferences provided.  Need new baseline wt following amputation.  Low
nutrition risk with appropriate nutrition interventions in place.

## 2019-10-07 NOTE — NUR
PATIENT ALERT AND ORIENTED X4.  C/O PAIN, MED GIVEN.  DRESSING ON R STUMP
REPLACED.  STAPLES AND SUTURES INTACT.  COLOSTOMY PATENT. SLEPT OFF AND ON
DURING NIGHT.

## 2019-10-07 NOTE — NUR
ASSUMED CARE AT 0700, SHIFT ASSESMENT DONE, MEDS GIVEN, VSS. WENT FOR DIALYSIS
THIS AM, 1L WAS TAKEN OFF. CAME BACK AT 12 PM. REPROTED PAIN, PRN PAIN AND
MUSCLE SPASM MED GIVEN WITH SOME RELIEF. DRESSING CHANGE TO RIGHT BKA DONE AS
PER ORDER. WILL CONTINUE TO ASSESS AND ASSIST WITH ADLs AS NEEDED.

## 2019-10-07 NOTE — NUR
SPOKE WITH LARS [facility] THAT PT WILL NOT DISCHARGE TODAY.
FAXED CLINICAL UPDATE AND SHE  WILL NEED TO RESUBMIT FOR AUTH.

## 2019-10-07 NOTE — HC
[facility]
Frannie Suarez
[place]   [address]                     CONSULTATION                  
_______________________________________________________________________________
 
Name:       GAVINO NGUYEN                   Room #:         202-P       [facility] [place]  
M.R.#:      [id number]                       Account #:      [id number]  
Admission:  09/03/19    Attend Phys:    Ambrose Cha MD      
Discharge:  09/13/19    Date of Birth:  [birth year]  
                                                          Report #: [id number]
                                                                    [id number]   
_______________________________________________________________________________
THIS REPORT FOR:   //name//                          
 
CC: James Read
 
DATE OF SERVICE:  09/04/2019
 
 
NEPHROLOGY CONSULTATION
 
REASON FOR CONSULTATION:  End-stage renal disease requiring hemodialysis.
 
HISTORY OF PRESENT ILLNESS:  This is a 74-year-old female who was admitted
yesterday after seeing Dr. William Read.  She has a prior right great toe
amputation, but has had pain and erythema and warmth in her right foot and is
admitted for treatment of cellulitis.  She is having further evaluation from a
vascular standpoint.
 
From our standpoint, the patient has end-stage renal disease.  She tells me she
has been on dialysis for the past 5 years.  She is unfamiliar with the etiology
of her end-stage renal disease, but it certainly sounds like it is likely due to
vascular disease and hypertension.  She says she previously dialyzed using a
right arm fistula, but now dialyzes using a tunneled right internal jugular vein
catheter over the past couple of years.  She normally dialyzes on a Monday,
Wednesday, Friday basis at [facility], which is by [facility].  She says she
last dialyzed 2 days ago on 09/02/2019.  She normally dialyzes 3 hours 15
minutes.  She is unaware of her ultrafiltration.  She is unaware of her other
dialysis parameters.
 
PAST MEDICAL HISTORY:  Extensive vascular disease.  She has had a previous left
below-the-knee amputation.  6 months ago, she had a right great toe amputation. 
She has had prior dialysis access procedure.  She has had the tunneled catheter
as noted above.  She has a long history of Crohn's, which dates back for much of
her adult life.  She ended up having a colectomy and has an ileostomy.  She
still has liquid stools from that.  She remains on sulfasalazine.  She has
anemia of end-stage renal disease.  She denies cardiac or pulmonary history.
 
MEDICATIONS:  Listed as Nephro-Pratima daily, Renvela 800 mg t.i.d. with meals,
Xarelto 2.5 mg daily, Neurontin 100 mg t.i.d., Ultram for pain, sulfasalazine
500 mg daily, Plavix 75 mg daily and Florinef 0.1 mg daily.  That is based upon
outpatient records.
 
ALLERGIES:  CODEINE.
 
 
 
 
[address]   [address]                     CONSULTATION                  
_______________________________________________________________________________
 
Name:       GAVINO NGUYEN                   Room #:         202-P       [facility] [place]  
M.R.#:      [id number]                       Account #:      [id number]  
Admission:  09/03/19    Attend Phys:    Ambrose Cha MD      
Discharge:  09/13/19    Date of Birth:  [birth year]  
                                                          Report #: [id number]
                                                                    [id number]   
_______________________________________________________________________________
FAMILY HISTORY:  Negative for renal disease.
 
SOCIAL HISTORY:  The patient is , list an address in [place], [place].
 
REVIEW OF SYSTEMS:  Has been very weak.  Having pain in her right leg.  Denies
dyspnea, cough, chest pain.  She is very tender in the right foot.  She recalls
no recent symptoms with her dialysis.
 
PHYSICAL EXAMINATION:
GENERAL:  Very frail female.
VITAL SIGNS:  Blood pressure 106/62, heart rate 95, oxygen saturation 96%,
respiratory rate 17, temperature 98.9.  She is 5 feet 2 inches tall, weighed
only 89 pounds.
HEENT:  Shows pupils are equal and reactive.  Sclerae nonicteric.  Oral mucosa
is dry.
NECK:  Veins are not distended.  She has a right internal jugular vein tunneled
dialysis catheter in place.
CHEST:  Clear bilaterally.
HEART:  Regular rate and rhythm.
ABDOMEN:  Has a few bowel sounds present.  Ostomy is present with draining of
liquid stool.
EXTREMITIES:  Show a left below-the-knee amputation.  She has a right great toe
amputation.  Right foot and leg are hot and tender.  No active drainage.
 
LABORATORY DATA:  Sodium 140, potassium 4.0, chloride 101, bicarbonate 20, BUN
44, creatinine 5.6, glucose 93, calcium 9.6, phosphorus 6.2, total protein 7.4,
albumin 2.2.  White count 10.2, hemoglobin 8.9, hematocrit 28.1, platelets
294,000.  Differential on the white count 76 neutrophils, 1 band, 8 lymphs, 13
monos.
 
ASSESSMENT AND PLAN:
1.  End-stage renal disease.  She is due for dialysis today.  We will get her
done with a 3-hour run on 3K dialysate.  Blood pressure is low.  She does not
look like she has a whole lot of volume on board, so we may not be able to get
much ultrafiltration.  We used her tunneled catheter.  We will then adjust going
forward for any changes in her dialysis.
2.  Peripheral vascular disease with a hot right foot.  She is on broad-spectrum
antibiotics.  Cultures are pending at this point.  Vascular evaluation
undergoing.
3.  Prior left below-the-knee amputation.
4.  Longstanding Crohn's with an ileostomy.
5.  Very frail and debilitated patient.  We will follow along in her care.
 
 
  <ELECTRONICALLY SIGNED>
   By: Kali Hassan MD        
  10/07/19     1756
D: 09/04/19 1808                           _____________________________________
T: 09/05/19 0334                           Kali Hassan MD          /nt

## 2019-10-07 NOTE — NUR
DR DOUG GONZALEZ WANTS RESP TO GET A SPUTUM C&S AND TO REPEAT A CXR IN AM.  HE IS
CONCERNED RELATED TO A TEMP  THIS WEEKEND AND CONCERNED SHE MAY HAVE AN
INFECTIOUS PROCESS IN HER CHEST.  CASE DISCUSSED WITH DR LINDQUIST ALSO.

## 2019-10-08 VITALS — SYSTOLIC BLOOD PRESSURE: 90 MMHG | DIASTOLIC BLOOD PRESSURE: 50 MMHG

## 2019-10-08 VITALS — DIASTOLIC BLOOD PRESSURE: 58 MMHG | SYSTOLIC BLOOD PRESSURE: 100 MMHG

## 2019-10-08 VITALS — SYSTOLIC BLOOD PRESSURE: 82 MMHG | DIASTOLIC BLOOD PRESSURE: 43 MMHG

## 2019-10-08 LAB
ERYTHROCYTE [DISTWIDTH] IN BLOOD BY AUTOMATED COUNT: 17.4 % (ref 10.5–14.5)
HCT VFR BLD CALC: 30.9 % (ref 37–47)
HGB BLD-MCNC: 9.5 GM/DL (ref 12–15)
MCH RBC QN AUTO: 25.1 PG (ref 26–34)
MCHC RBC AUTO-ENTMCNC: 30.6 G/DL (ref 28–37)
MCV RBC: 82.2 FL (ref 80–100)
PLATELET # BLD: 374 THOU/UL (ref 150–400)
RBC # BLD AUTO: 3.76 MIL/UL (ref 4.2–5)
WBC # BLD AUTO: 9 THOU/UL (ref 4–11)

## 2019-10-08 NOTE — NUR
CT OF CHEST SHOWS LARGE PLEURAL EFFUSION ON RIGHT SIDE.  AWAITING NEXT STEPS.
ONCE MEDICALLY STABLE PLAN WILL BE TO TRANSFER TO SNF AT [facility].  S/W
LARS IN ADMISSIONS THERE & WITH PEPPER FROM [facility] TO UPDATE BOTH.

## 2019-10-08 NOTE — NUR
ASSUMED CARE OF PT AT 0700. PT WAS A&O X2. PT PAIN UNDER CONTROL WITH MEDS AND
REPOSITIONING. WOUND DRESSING LOOKS CLEAN AND INTACT WITH NO DRAINAGE PRESENT.
PT IS TAKING SUPPLEMENTS 2X DAILY. COLOSTOMY CHANGED. FALL PRECAUTIONS IN
PLACE. BED IN LOWEST POSITION. PT HAD XRAY OF CHEST PERFORMED TODAY. WILL
CONTINUE TO MONITOR PT.

## 2019-10-09 VITALS — DIASTOLIC BLOOD PRESSURE: 49 MMHG | SYSTOLIC BLOOD PRESSURE: 94 MMHG

## 2019-10-09 VITALS — SYSTOLIC BLOOD PRESSURE: 82 MMHG | DIASTOLIC BLOOD PRESSURE: 42 MMHG

## 2019-10-09 VITALS — SYSTOLIC BLOOD PRESSURE: 85 MMHG | DIASTOLIC BLOOD PRESSURE: 30 MMHG

## 2019-10-09 VITALS — SYSTOLIC BLOOD PRESSURE: 103 MMHG | DIASTOLIC BLOOD PRESSURE: 80 MMHG

## 2019-10-09 LAB
ERYTHROCYTE [DISTWIDTH] IN BLOOD BY AUTOMATED COUNT: 17 % (ref 10.5–14.5)
HCT VFR BLD CALC: 23.2 % (ref 37–47)
HGB BLD-MCNC: 7.3 GM/DL (ref 12–15)
MCH RBC QN AUTO: 25.1 PG (ref 26–34)
MCHC RBC AUTO-ENTMCNC: 31.3 G/DL (ref 28–37)
MCV RBC: 80.2 FL (ref 80–100)
PLATELET # BLD: 338 THOU/UL (ref 150–400)
RBC # BLD AUTO: 2.89 MIL/UL (ref 4.2–5)
WBC # BLD AUTO: 6.7 THOU/UL (ref 4–11)

## 2019-10-09 NOTE — NUR
LAB CALLED AT 0518 TO REPORT HGB CHANGE. THE NEW HGB IS 7.3. NURSE PRACTIONER
ON CALL WAS NOTIFIED. WILL MONITOR PATIENT FOR ANY CHANGES.
PT HX OF TRANFUSION 9 DAYS AGO.

## 2019-10-09 NOTE — NUR
PT IS GETING DIALYSIS TODAY. ALERT TO SELF WITH SOME CONFUSING. REORIENTATION
TO SITUATION. DIALYSIS TODAY WITH DIALYSIS NURSE. CREAM TO COCCYX. AREA FAMILY
AT [facility]. BLOOD SUGARS DONE NO INSULIN NEEDED AT THIS TIME. COLOSTOMY BAG.
NO COMPLAINTS OF PAIN NOTED. COUGH NON PRODUCTIVE COUGH NOTED. PULMONARY
CONSULT NOTIFIED TODAY. WILL CONTINUE TO ASESS AND MONITOR PER NURSING

## 2019-10-09 NOTE — 2DMMODE
[facility]
[address]
[place]  [address]
Phone:  [phone] 2 D/M-MODE ECHOCARDIOGRAM     
_______________________________________________________________________________
 
Name:            GAVINO NGUYEN                   Room #:        437-P       ADM IN
M.R.#:           [id number]          Account #:     [id number]  
Admission:       19         Attend Phys:   Ambrose Cha MD  
Discharge:                  Date of Birth: [birth year]  
                         Report #:      [id number]
        [id number]
_______________________________________________________________________________
THIS REPORT FOR:   //name//                          
 
 
--------------- APPROVED REPORT --------------
 
 
Study performed:  10/09/2019 13:15:15
 
EXAM: Comprehensive 2D, Doppler, and color-flow 
Echocardiogram 
Patient Location: Bedside   
Room #:  437     Status:  routine
 
        BSA:         1.41
HR: 79 bpm   BP:          103/80 mmHg 
Rhythm: Sinus arrhythmia     
 
Other Information 
Study Quality: Good/Low parasternal window
 
Indications
Congestive Heart Failure
Hx: Afib, PVD, left BKA.
 
2D Dimensions
RVDd:  44.81 mm  
IVSd:  8.56 (7-11mm) LVOT Diam:  19.97 (18-24mm) 
LVDd:  41.92 mm  
PWd:  6.58 (7-11mm) 
LVDs:  39.06 (25-40mm) 
Aortic Root:  34.21 mm 
 
Volumes
Left Atrial Volume (Systole) 
Single Plane 4CH:  65.44 mL Single Plane 2CH:  92.81 mL
    LA ESV Index:  58.00 mL/m2
 
Aortic Valve
AoV Peak Alexis.:  1.57 m/s 
AO Peak Gr.:  9.84 mmHg  LVOT Max P.20 mmHg
    LVOT Max V:  1.24 m/s
KHADIJAH Vmax: 2.48 cm2  
 
Mitral Valve
    E/A Ratio:  0.8
    MV Decel. Time:  306.66 ms
 
 
[facility]
[address]
[place]  [address]
Phone:  [phone] 2 D/M-MODE ECHOCARDIOGRAM     
_______________________________________________________________________________
 
Name:            GAVINO NGUYEN                   Room #:        437-P       ADM IN
.R.#:           [id number]          Account #:     [id number]  
Admission:       19         Attend Phys:   Ambrose Cha MD  
Discharge:                  Date of Birth: [birth year]  
                         Report #:      [id number]
        [id number]
_______________________________________________________________________________
MV E Max Alexis.:  0.77 m/s 
MV A Alexis.:  0.95 m/s  
MV PHT:  88.93 ms  
IVRT:  72.66 ms   
 
Pulmonary Valve
PV Peak Alexis.:  0.73 m/s PV Peak Gr.:  2.11 mmHg
 
Tricuspid Valve
TR Peak Alexis.:  3.08 m/s  RAP Estimate:  10.00 mmHg
TR Peak Gr.:  38.00 mmHg 
    PA Pressure:  48.00 mmHg
 
Left Ventricle
The left ventricle is normal size. Regional wall motion abnormalities 
are noted with significant inferoseptal wall hypokinesis. There is 
normal left ventricular wall thickness. Left ventricular systolic 
function is moderately decreased. LVEF is 35-40%. Mild diastolic 
dysfunction is present (impaired relaxation pattern).
 
Right Ventricle
Right ventricle is mildly dilated. The right ventricular systolic 
function is normal.
 
Atria
Left atrium is severely dilated. Right atrium is severely 
dilated.
 
Aortic Valve
The aortic valve is normal in structure. No aortic regurgitation is 
present. There is no aortic valvular stenosis.
 
Mitral Valve
Mitral valve leaflets are mildly thickened. Mild mitral annular 
calcification. Moderate mitral regurgitation. No evidence of mitral 
valve stenosis.
 
Tricuspid Valve
The tricuspid valve is normal in structure. Moderate to severe 
tricuspid regurgitation. Estimated PAP is 50mmHg.
 
Pulmonic Valve
The pulmonary valve is normal in structure. Trace pulmonic 
regurgitation.
 
Great Vessels
 
 
[facility]
[address]
[place]  [address]
Phone:  [phone]                    2 D/M-MODE ECHOCARDIOGRAM     
_______________________________________________________________________________
 
Name:            GAVINO NGUYEN                   Room #:        437-P       ADM IN
M.R.#:           [id number]          Account #:     [id number]  
Admission:       19         Attend Phys:   Ambrose Cha MD  
Discharge:                  Date of Birth: [birth year]  
                         Report #:      [id number]
        [id number]
_______________________________________________________________________________
The aortic root is normal in size. Ascending aorta is not well 
visualized. IVC is normal in size and collapses <50% with 
inspiration.
 
Pericardium
There is no pericardial effusion. Right pleural effusion 
noted.
 
<Conclusion>
The left ventricle is normal size.
LVEF is 35-40%.
Regional wall motion abnormalities are noted with significant 
inferoseptal wall hypokinesis.
Right ventricle is mildly dilated.
Left atrium is severely dilated.
Right atrium is severely dilated.
Mitral valve leaflets are mildly thickened.
Mild mitral annular calcification.
Moderate mitral regurgitation.
The tricuspid valve is normal in structure.
Moderate to severe tricuspid regurgitation. Estimated PAP is 
50mmHg.
The pulmonary valve is normal in structure.
Trace pulmonic regurgitation.
Right pleural effusion noted.
 
 
 
 
 
 
 
 
 
 
 
 
 
 
 
 
 
 
 
  <ELECTRONICALLY SIGNED>
   By: Raul Desir MD    
  10/09/19     1414
D: 10/09/19 1414                           _____________________________________
T: 10/09/19 1414                           Raul Dseir MD      /INF

## 2019-10-09 NOTE — NUR
Assumed pt care at 1900. A/O 2-3,able to make needs known. C/o pain to
buttocks, repositioned every 2 hrs and pain medication given as ordered with
relief reported. Pt's IV dislodged, re-inserted with 3 attempts on LUE.
Colostomy intact with liquidy light brown stool.Pt is anuric. Has a right
chest tesio. Dressing on right stump C/D/I.Fall precautions in place,resting
without distress,02 on @ 2L/NC. Will continue to monitor pt.

## 2019-10-10 VITALS — SYSTOLIC BLOOD PRESSURE: 91 MMHG | DIASTOLIC BLOOD PRESSURE: 48 MMHG

## 2019-10-10 VITALS — SYSTOLIC BLOOD PRESSURE: 98 MMHG | DIASTOLIC BLOOD PRESSURE: 46 MMHG

## 2019-10-10 VITALS — DIASTOLIC BLOOD PRESSURE: 48 MMHG | SYSTOLIC BLOOD PRESSURE: 82 MMHG

## 2019-10-10 VITALS — DIASTOLIC BLOOD PRESSURE: 62 MMHG | SYSTOLIC BLOOD PRESSURE: 90 MMHG

## 2019-10-10 LAB
APTT BLD: 35.3 SECONDS (ref 24.5–32.8)
BF MACROPHAGE: 43
BF NEUTROPHILS: 18
BF NUCLEATED CELLS: 296
BF RBC: 872
CLARITY UR: (no result)
COLOR UR: YELLOW
INR PPP: 1.2
PROTHROMBIN TIME: 12.5 SECONDS (ref 9.3–11.4)
SOURCE: (no result)
SPECIMEN VOL 24H UR: 80 ML

## 2019-10-10 NOTE — NUR
FAXED CLINICAL UPDATE TO [facility] RECEIVED CONFIRMATION AND SPOKE WITH
 LARS IN ADM SHE RECEIVED UPDATE AND IS SUBMITTING FOR AUTH.

## 2019-10-10 NOTE — NUR
ASSUMED Pt CARE AT BEGINNING OF SHIFT. BP WAS LOW SECONDARY TO DIALYSIS. Pt
WAS ASYMPTOMATIC AND DENIED ANY DIZZINES. ALL OTHER VS WERE STABLE. Pt STATES
SHE IS IN CONSTANT PAIN. REPOSITIONING AND PAIN MEDICATIONS PROVIDES SOME
RELIEF. Pt IS NPO p MIDNIGHT FOR A PROCEDURE IN THE MORNING. Pt VOICES PAIN AS
HER ONLY CONCERN AT THE MOMENT. REPOSITIONED AND GIVEN MEDICATIONS. WILL
CONTINUE TO MONITOR.

## 2019-10-10 NOTE — NUR
ASSUMED CARE OF PT AT 1300. PT WAS NPO IN THE AM DUE TO ULTRASOUND @0853.PTS
BP HAS BEEN LOW. BP MEDICATION HAS BEEN GIVEN. WOUND DRESSING CLEAN, DRY AND
INTACT. PAIN UNDER CONTROL WITH PAIN MEDS. PT IS A&OX4. FALL PRECAUTIONS IN
PLACE. PT IS IN THE LOWEST POSITION. CALL LIGHT WITHIN REACH. WILL CONTINUE TO
MONITOR THE PT.

## 2019-10-11 VITALS — DIASTOLIC BLOOD PRESSURE: 66 MMHG | SYSTOLIC BLOOD PRESSURE: 104 MMHG

## 2019-10-11 VITALS — SYSTOLIC BLOOD PRESSURE: 105 MMHG | DIASTOLIC BLOOD PRESSURE: 62 MMHG

## 2019-10-11 VITALS — SYSTOLIC BLOOD PRESSURE: 96 MMHG | DIASTOLIC BLOOD PRESSURE: 64 MMHG

## 2019-10-11 VITALS — SYSTOLIC BLOOD PRESSURE: 90 MMHG | DIASTOLIC BLOOD PRESSURE: 56 MMHG

## 2019-10-11 LAB
ALBUMIN FLD-MCNC: 1.7 G/DL
AMYLASE FLD-CCNC: 18 U/L
ANION GAP SERPL CALC-SCNC: 11 MMOL/L (ref 7–16)
ANION GAP SERPL CALC-SCNC: 7 MMOL/L (ref 7–16)
BODY FLUID LDH: 76 IU/L
BUN SERPL-MCNC: 18 MG/DL (ref 7–18)
BUN SERPL-MCNC: 7 MG/DL (ref 7–18)
CALCIUM SERPL-MCNC: 8 MG/DL (ref 8.5–10.1)
CALCIUM SERPL-MCNC: 8.8 MG/DL (ref 8.5–10.1)
CHLORIDE SERPL-SCNC: 101 MMOL/L (ref 98–107)
CHLORIDE SERPL-SCNC: 101 MMOL/L (ref 98–107)
CO2 SERPL-SCNC: 27 MMOL/L (ref 21–32)
CO2 SERPL-SCNC: 32 MMOL/L (ref 21–32)
CREAT SERPL-MCNC: 1.6 MG/DL (ref 0.6–1)
CREAT SERPL-MCNC: 3.6 MG/DL (ref 0.6–1)
ERYTHROCYTE [DISTWIDTH] IN BLOOD BY AUTOMATED COUNT: 17.6 % (ref 10.5–14.5)
GLUCOSE FLD-MCNC: 86 MG/DL
GLUCOSE SERPL-MCNC: 154 MG/DL (ref 74–106)
GLUCOSE SERPL-MCNC: 79 MG/DL (ref 74–106)
HCT VFR BLD CALC: 23 % (ref 37–47)
HGB BLD-MCNC: 7.2 GM/DL (ref 12–15)
MAGNESIUM SERPL-MCNC: 1.8 MG/DL (ref 1.8–2.4)
MCH RBC QN AUTO: 25.1 PG (ref 26–34)
MCHC RBC AUTO-ENTMCNC: 31.3 G/DL (ref 28–37)
MCV RBC: 80.2 FL (ref 80–100)
PLATELET # BLD: 373 THOU/UL (ref 150–400)
POTASSIUM SERPL-SCNC: 2.8 MMOL/L (ref 3.5–5.1)
POTASSIUM SERPL-SCNC: 3.3 MMOL/L (ref 3.5–5.1)
PROT FLD-MCNC: 3.2 G/DL
RBC # BLD AUTO: 2.87 MIL/UL (ref 4.2–5)
SODIUM SERPL-SCNC: 139 MMOL/L (ref 136–145)
SODIUM SERPL-SCNC: 140 MMOL/L (ref 136–145)
SOURCE: (no result)
WBC # BLD AUTO: 8 THOU/UL (ref 4–11)

## 2019-10-11 NOTE — NUR
PT HAS WORKED WITH THERAPY. ATE MEALS AND DRANK  SUPPLEMENTS. HAD DIAYLSIS
TODAY 1 LITER REMOVED. GAVE KCL PO XS 2 . PT HAD VIT B12 SHOT. TREATMENT AS
ORDERED. DAUGHTER AND  IN ROOM.

## 2019-10-11 NOTE — NUR
PATIENT ALERT AND ORIENTED X4.  C/O PAIN.  MED GIVEN.  APPEARED TO BE ASLEEP
MOST OF NIGHT BUT STATES SHE WAS AWAKE MOST OF NIGHT.  DID NOT CALL OUT FOR
ANY PAIN MED MOST OF THE NIGHT.  COLOSTOMY INTACT.  RADHA AMPUTEE, WITH RIGHT
SIDR=E THE NEWEST.  DRESSING ON THE R IS D/I.

## 2019-10-11 NOTE — NUR
PATIENT STARTING DIAYLSIS AT THIS TIME WILL HOLD MEDS UNTIL COMPLETED PT
SLEEPING NO PAIN OR RESP DISTRESS NOTED

## 2019-10-11 NOTE — NUR
IF PT SHOULD [facility] FAX DC ORDERS/SUMMARY
[phone] AND CALL REPORT [phone] AND SET UP TRANSPORT.

## 2019-10-12 VITALS — SYSTOLIC BLOOD PRESSURE: 84 MMHG | DIASTOLIC BLOOD PRESSURE: 50 MMHG

## 2019-10-12 VITALS — DIASTOLIC BLOOD PRESSURE: 52 MMHG | SYSTOLIC BLOOD PRESSURE: 92 MMHG

## 2019-10-12 VITALS — SYSTOLIC BLOOD PRESSURE: 87 MMHG | DIASTOLIC BLOOD PRESSURE: 54 MMHG

## 2019-10-12 VITALS — DIASTOLIC BLOOD PRESSURE: 43 MMHG | SYSTOLIC BLOOD PRESSURE: 85 MMHG

## 2019-10-12 NOTE — NUR
PATIENT ALERT AND ORIENTED X4. PATIENT DID NOT WANT HER DRESSING CHANGED,
REMAINS DRY AND INTACT. COLOSTOMY CARE DONE. 2LNC INTACT W/O SOA. MEDICATED
FOR PAIN PRN WITH GOOD RESULTS. DRESSING TO RIGHT BKA DRY AND INTACT. PATIENTS
POTASSIUM LEVEL 3.3 - WILL HAVE DRAW IN AM. RESTING QUIETLY.

## 2019-10-13 VITALS — SYSTOLIC BLOOD PRESSURE: 96 MMHG | DIASTOLIC BLOOD PRESSURE: 53 MMHG

## 2019-10-13 VITALS — DIASTOLIC BLOOD PRESSURE: 64 MMHG | SYSTOLIC BLOOD PRESSURE: 109 MMHG

## 2019-10-13 VITALS — SYSTOLIC BLOOD PRESSURE: 96 MMHG | DIASTOLIC BLOOD PRESSURE: 58 MMHG

## 2019-10-13 VITALS — SYSTOLIC BLOOD PRESSURE: 91 MMHG | DIASTOLIC BLOOD PRESSURE: 53 MMHG

## 2019-10-13 NOTE — NUR
ASSUMED CARE OF PT AT 0700. CHANGED PTS SACRUM DRESSING. CLEAN, DRY AND
INTACT. PAIN UNDER CONTROL THROUGH PAIN MEDS. NASAL CANNULA ON PT SET AT 2.0
LITERS. COLOSTOMY EMPTIED. DRESSINGS ON R BKA AND ABOVE ARE CLEAN DRY AND
INTACT. FALL PRECAUTIONS IN PLACE. BED IN LOWEST POSITION. CALL LIGHT WITHIN
REACH. WILL CONTINUE TO MONITOR PT.

## 2019-10-13 NOTE — NUR
PT IN IS O/A X4.PAIN MGT WITH OXYCODONE.DRESSING D/C/I.PT PENDING LAB CULTURE
FOR THORACENTESIS.PT IS DUE FOR DIALYSIS TOMORROW.PT IS Q2H.COLOSTOMY
CHANGED.O2/NC/2L IN PLACE.CONTINUE POC TILL EOS

## 2019-10-14 VITALS — DIASTOLIC BLOOD PRESSURE: 64 MMHG | SYSTOLIC BLOOD PRESSURE: 115 MMHG

## 2019-10-14 VITALS — DIASTOLIC BLOOD PRESSURE: 62 MMHG | SYSTOLIC BLOOD PRESSURE: 110 MMHG

## 2019-10-14 LAB
ANION GAP SERPL CALC-SCNC: 15 MMOL/L (ref 7–16)
BUN SERPL-MCNC: 40 MG/DL (ref 7–18)
CALCIUM SERPL-MCNC: 8.4 MG/DL (ref 8.5–10.1)
CHLORIDE SERPL-SCNC: 101 MMOL/L (ref 98–107)
CO2 SERPL-SCNC: 21 MMOL/L (ref 21–32)
CREAT SERPL-MCNC: 5.2 MG/DL (ref 0.6–1)
ERYTHROCYTE [DISTWIDTH] IN BLOOD BY AUTOMATED COUNT: 18.1 % (ref 10.5–14.5)
GLUCOSE SERPL-MCNC: 67 MG/DL (ref 74–106)
HCT VFR BLD CALC: 24.5 % (ref 37–47)
HGB BLD-MCNC: 7.5 GM/DL (ref 12–15)
MCH RBC QN AUTO: 24.5 PG (ref 26–34)
MCHC RBC AUTO-ENTMCNC: 30.7 G/DL (ref 28–37)
MCV RBC: 79.8 FL (ref 80–100)
PLATELET # BLD: 472 THOU/UL (ref 150–400)
POTASSIUM SERPL-SCNC: 3.5 MMOL/L (ref 3.5–5.1)
RBC # BLD AUTO: 3.07 MIL/UL (ref 4.2–5)
SODIUM SERPL-SCNC: 137 MMOL/L (ref 136–145)
WBC # BLD AUTO: 11.6 THOU/UL (ref 4–11)

## 2019-10-14 NOTE — NUR
PATIENT HAD DIAYLSIS TODAY 1100 REMOVED ATE DINNER AFTER. EARLIER TREATMENTS
AS ORDERED. PT WAS UP FOR BREAKFAST AND LUNCH TO BEDSIDE CHAIR. COLOSTOMY
EMPTIED. R TESSIO FOR DIAYLSIS. PT TO DISCHARGE TOMMOROW. NIGHT SHIFT TO GIVE
ABT PO AFTER DIAYLSIS.  HERE TO VISIT. PT PLEASANT AND COOPERATIVE WITH
CARE.

## 2019-10-14 NOTE — NUR
Follow up: s/p right BKA 9/29, and hx left BKA, sacral ulcer-wound care team
is following.  Hemodialysis today.  Discharge pending soon, pt has had
pneumonia.  Eating % of meals and drinking up to 2 nepro supplements per
day.  Severe Protein calorie malnutrition is stable as nutrition needs being
met.  Need new baseline wt.  Low nutrition risk

## 2019-10-14 NOTE — NUR
PT APPEARED TO BE IN PAIN BY GRIMACING AND FROWNING. ADMINISTERED SILVER
SULFADIAZINE/MORPHINE CREAM ON PtS L BUTTOCK WOUND. BARRIER PAD AND OLD
DRESSING WERE SOAKED IN CLEAR/YELLOW DRAINAGE. AREA CLEANED, TREATED AND NEW
DRESSING PAD APPLIED. Pt STATED PAIN AT A 9/10. WILL CONTINUE TO MONITOR AND
FOLLOW PLAN OF CARE.

## 2019-10-14 NOTE — PATH
[facility]
[address]
[place]   [address]                     PATHOLOGY RPT PROCEDURE       
_______________________________________________________________________________
 
Name:       GAVINO NGUYEN                   Room #:         437-P       ADM IN  
M.R.#:      [id number]     Account #:      [id number]  
Admission:  09/24/19    Date of Birth:  [birth year]  
Discharge:                                              Report #:    [id number]
                                                        Path Case #: [id number]
_______________________________________________________________________________
Note
LCA Accession Number: [id number]
   TESTS               RESULT  FLAG  UNITS    REF RANGE  LAB
------------------------------------------------------------
   Clinician Provided Cytology Information
   No. of containers..01 Other (Miscellaneous)
Source:                                                   01
   THORACENTESIS
DIAGNOSIS:                                                02
   THORACENTESIS
   NEGATIVE FOR MALIGNANT EPITHELIAL CELLS.
   REACTIVE MESOTHELIAL CELLS ARE PRESENT.
   SCANT CELLULARITY.
   RARE LYMPHOCYTES PRESENT IN THE BACKGROUND.
Pathologist ICD10:                                        02
   M62.262
Signed out by:                                            02
   Manuela Shipley MD, Pathologist
   NPI- [id number]
Performed by:                                             01
   Shannon Pérez Cytotechnologist ([facility])
Gross description:                                        01
   15 ML, YELLOW, CLEAR
   /LCS  12/31/1840  0000 Local
 
------------------------------------------------------------
    FLAG LEGEND:
    L-Low Normal,H-High Normal,LL-Alert Low,HH-Alert High
    <-Panic Low,>-Panic High,A-Abnormal,AA-Critical Abnormal
------------------------------------------------------------
 
Performed at:
[address]
   [place]  [address]
   Dejan Cardoso MD, Phone: [phone]
[address] [place] [address]  [address]
   Manuela Shipley MD, Phone: [phone]
Specimen Comment: A courtesy copy of this report has been sent to
Specimen Comment: [phone].
Specimen Comment: Report sent to 
***Performed at:  01
   [address], [place], [place]  [id number]
   MD Dejan Cardoso MD Phone:  [phone]

## 2019-10-14 NOTE — NUR
WOUND CARE NOTE;
PT UP IN CHAIR, RIGHT KNEE WOUND CLEANSED W/ NS, NO STING SKIN PREP APPLIED,
LEFT OPEN TO AIR PER ORDERS, WOUND MEASUREMENTS IN PROCESS INTERVENTION, NO
DRAINAGE, PINK VIABLE TISSUE PRESENT 90% OF WOUND, SMALL AREA ESCHAR CENTER OF
WOUND, HEALING, COOPERATIVE W/ CARE, STAFF RN INFORMED OF CARE. WILL ASSESS
OTHER WOUNDS WHEN WOUND DR MAKES ROUNDS TODAY

## 2019-10-14 NOTE — NUR
OSTOMY CARE;
POUCH EDGES LOOSE, NEW POUCH JUANA 2 PIECE SYSTEM W/ ADAPT RING APPLIED,
STOMA PINK VIABLE BUDDED W/ LOOSE BROWN STOOL NOTED, PERISTOMAL SKIN INTACT,
COOPERATIVE W/ CARE, SUPPLIES AT BS, WILL CONT TO FOLLOW PRN

## 2019-10-14 NOTE — NUR
PT WAS TO DISCHARGE TO [facility] HC OF OP FAXED DC ORDERS/SUMMARY TO FACILITY
SPOKE WITH LARS IN ADM SHE RECEIVED DC ORDERS AND ARRANGED TRANSPORT FOR 1430
TODAY AND PT'S DTR ([facility]) NOTIFIED OF DC AND TIME OF TRANSPORT, BUT PT HAS
NOT HAVE DIALYSIS AS OF 1400 SO LARS FROM [facility] WILL NOT TAKE A ADMIT THAT
LATE RIGHT AFTER DIALYSIS ANY PT WILL DISCHARGE TOMORROW 10/15 TRANSPORT
ARRANGED FOR 1000. I NOTIFIED PT'S DTR (STASI) OF CHANGE IN DISCHARGE PLANS
AND SHE AWARE OF DC TOMORROW AT 1000. UNIT NOTIFIED OF CHANGE IN DISCHARGE.
DP TO FOLLOW.

## 2019-10-14 NOTE — NUR
ASSUMED CARE OF Pt AT APPROX. 2000. ASSESSMENT AS CHARTED. MEDS GIVEN PER MAR.
Pt IS A&OX4,VS WNL FOR Pt. O2 SATS ARE 91% ON RA. O2 AVAILABLE AS NEEDED, 2L
VIA NASAL CANNULA. Pt REMAINS ON BEDREST AND IS TO BE TURNED Q2 HRS. Pt VOICES
A PAIN LEVEL OF 8/10 ON HER R KNEE STUMP, AND 9/10 ON HER SACRUM. WOUND CARE
DONE PER NURSING ORDERS. Pt CONTINUES TO HAVE GOOD OUTPUT VIA COLOSTOMY. Pt
WILL CONTINUE TO BE MONITORED AND TREATED FOR PAIN. Pt TO RECIEVE DIALYSIS
TODAY 10/14/19. FALL PRECAUTIONS IN PLACE, CALL LIGHT WITHIN REACH, FREQUENT
ROUNDING DONE THROUGH SHIFT.

## 2019-10-15 VITALS — DIASTOLIC BLOOD PRESSURE: 60 MMHG | SYSTOLIC BLOOD PRESSURE: 104 MMHG

## 2019-10-15 VITALS — SYSTOLIC BLOOD PRESSURE: 100 MMHG | DIASTOLIC BLOOD PRESSURE: 58 MMHG

## 2019-10-15 NOTE — NUR
ASSUMED PT CARE AROUND 1900. PT IS ORIENTED X3-4. PT FINISHED DIALYSIS
DURING BEGINNING OF THIS SHIFT. TOLERATED WELL. BG WAS 56 BUT PT WAS
ASYMPTOMATIC.  PT ATE LATE DINNER AND BG IMPROVED TO WITHIN NORMAL LIMITS. C/O
RIGHT LEG AND KNEE PAIN.  PAIN MEDICATION GIVEN AS ORDERED. REPOSITIONED TO
PREVENT FURTHER SKIN BREAKDOWN. PT HAS BEEN SLEEPING MOST OF THE NIGHT. RESP
EVEN AND UNLABORED.  VSS. AFEBRILE. FALL PRECAUTIONS IN PLACE. PROGRESSING
TOWARD POC GOALS. WILL CONTINUE TO MONITOR FURTHER.

## 2019-10-15 NOTE — NUR
ASSUMED CARE OF PT AT 0700. COLOSTOMY DRAINED 300. ALL MORNING MEDICATIONS
GIVEN. PT ON 2.0  OF O2. DRESSING ON THE R BKA CLEANED WITH NS, XEROFORM, ABD,
AND KERLIX. WOUND ABOVE R BKA CLEANED AND AIR DRYED. WOUND ON THE SACRUM
CLEANED WITH NS AND SILVERDENE/MORPHINE CREAM APPLIED MIXED WITH BARRIER
CREAM AND BORDERFOAM APPLIED ALONG WITH TRANSPARENT DRESSING TO KEEP IN PLACE.
PICTURES OF WOUNDS TAKEN. PT WAS DISCAHRGED TO [facility]
[phone]. CALLED AND GAVE REPORT TO THE ADMISSION NURSE.

## 2019-10-16 NOTE — HC
[facility]
[address]
[place]   [address]                     CONSULTATION                  
_______________________________________________________________________________
 
Name:       GAVINO NGUYEN                   Room #:         437-P       [facility] [place]  
M.R.#:      [id number]                       Account #:      [id number]  
Admission:  09/24/19    Attend Phys:    Ambrose Cha MD      
Discharge:  10/15/19    Date of Birth:  [birth year]  
                                                          Report #: [id number]
                                                                    [id number]   
_______________________________________________________________________________
THIS REPORT FOR:   //name//                          
 
CC: James Merritthens
 
DATE OF SERVICE:  09/26/2019
 
 
REASON FOR CONSULTATION:  Osteomyelitis, right toe.
 
HISTORY OF PRESENT ILLNESS:  The patient is a 74-year-old female with a history
of right great toe amputation on 09/02/2019.  She was admitted from the wound
care team with worsening pain apparently and cellulitis.  Yesterday, she
underwent thrombectomy with a resultant good patency per the report.
 
REVIEW OF SYSTEMS:
SKIN:  Denies other wounds.
NEUROLOGIC:  Denies numbness or tingling.
 
PAST MEDICAL HISTORY:  Obtained partially from the patient's medical record and
partially from the patient, which includes end-stage renal disease, coronary
artery disease and peripheral vascular disease.
 
ALLERGIES:  INCLUDE CODEINE.
 
SOCIAL HISTORY:  The patient lives at home with her , does not ambulate. 
Alcohol and smoking status is unknown.
 
MEDICATIONS:  The MAR was reviewed, which at this point shows fentanyl,
multivitamins, midodrine, fludrocortisone, Senokot, amiodarone, Zosyn,
gabapentin, clotrimazole, oxycodone, ondansetron and acetaminophen.
 
PAST SURGICAL HISTORY:  Left below-knee amputation and right great toe
amputation.
 
LABORATORY STUDIES:  Done on 09/26/2019 show white blood cell count 12.5.  This
is decreased from 09/24/2019 when the white blood cell count was 15.3,
hemoglobin 10.3, hematocrit 34.5, platelet count 401.  ESR on 09/24/2019 was 30.
 Chemistry on 09/26/2019 shows a low potassium at 3.2.  Sodium is normal. 
Creatinine is elevated at 3.1, albumin is low at 2.5.
 
PHYSICAL EXAMINATION:
GENERAL:  The patient is alert and oriented x 3.  She interacts appropriately. 
She is a well-developed, well-nourished female in no acute distress.
VITAL SIGNS:  Most recent vital signs show temperature of 36.7, heart rate 66,
 
 
 
[facility]
[address]
[place]   [address]                     CONSULTATION                  
_______________________________________________________________________________
 
Name:       GAVINO NGUYEN                   Room #:         437-P       [facility] [place]  
..#:      [id number]                       Account #:      [id number]  
Admission:  09/24/19    Attend Phys:    Ambrose Cha MD      
Discharge:  10/15/19    Date of Birth:  [birth year]  
                                                          Report #: [id number]
                                                                    [id number]   
_______________________________________________________________________________
respiratory rate 18, blood pressure 91/51, pulse oximetry is 99% on 2 liters
nasal cannula.
EXTREMITIES:  Examination of her right lower extremity, there is a surgical
wound with an eschar at the location of the great toe amputation site.  There is
no erythema.  Sensation is intact. I am unable to palpate pulses.  She has brisk
capillary refill.  She has moderate tenderness at the surgical site.
 
IMPRESSION AND PLAN:  Status post right great toe amputation approximately 3
weeks ago.  This is healing very slowly.  The patient underwent a vascular
procedure with Interventional Radiology yesterday.  I will await the wound care
team and ID's recommendations regarding if the patient is able to heal this
wound or requires a below-knee amputation.
 
Thank you very much for allowing me to participate in the care of this patient. 
I will continue to follow.
 
 
 
 
 
 
 
 
 
 
 
 
 
 
 
 
 
 
 
 
 
 
 
 
 
 
 
 
 
  <ELECTRONICALLY SIGNED>
   By: Donya Proctor MD      
  10/16/19     1425
D: 09/27/19 0714                           _____________________________________
T: 09/27/19 0856                           Donya Proctor MD        /nt

## 2019-10-16 NOTE — O
[facility]
[address]
[place]   [address]                     OPERATIVE REPORT              
_______________________________________________________________________________
 
Name:       GAVINO NGUYEN                   Room #:         437-P       [facility] [place]  
M.R.#:      [id number]                       Account #:      [id number]  
Admission:  09/24/19    Attend Phys:    Ambrose Cha MD      
Discharge:  10/15/19    Date of Birth:  [birth year]  
                                                          Report #: [id number]
                                                                    [id number]   
_______________________________________________________________________________
THIS REPORT FOR:   //name//                          
 
CC: James Merritthens
 
DATE OF SERVICE:  09/29/2019
 
 
PREOPERATIVE DIAGNOSES:  Right ischemic foot with wound and osteomyelitis and
peripheral vascular disease.
 
POSTOPERATIVE DIAGNOSES:  Right ischemic foot with wound and osteomyelitis and
peripheral vascular disease.
 
PROCEDURE PERFORMED:  Right below-knee amputation.
 
SURGEON:  Donya Proctor MD
 
ANESTHESIA:  General mask anesthesia.
 
ESTIMATED BLOOD LOSS:  300 mL.
 
TOURNIQUET TIME:  111 minutes.
 
COMPLICATIONS:  None.
 
CONDITION:  Stable.
 
DISPOSITION:  Recovery room.
 
INDICATIONS:  The patient is a 74-year-old female with severe right peripheral
vascular disease and claudication.  She is required to hold her foot off the
edge of the bed to allow her pain to be tolerable.  She has a nonhealing wound
from the great toe amputation and second toe osteomyelitis.  It has been
recommended by myself and multiple other physicians for the patient to undergo
below-knee amputation.  The risks, benefits, alternatives, complications were
discussed including but not limited to infection, wound healing problems, stump
pain, infection.  I spoke with both the patient and her .  Informed
consent was obtained.  The correct extremity was identified and labeled by
myself after verbal confirmation of the patient as well as visual confirmation
and signed informed consent.
 
DESCRIPTION OF PROCEDURE:  The patient was brought back to the operating room,
placed on the operating table in the supine position.  She received preoperative
antibiotics.  Tourniquet was placed over padding on the patient's right lower
 
 
 
[address]   [address]                     OPERATIVE REPORT              
_______________________________________________________________________________
 
Name:       GAVINO NGUYEN                   Room #:         437-P       [facility] [place]  
M.R.#:      [id number]                       Account #:      [id number]  
Admission:  09/24/19    Attend Phys:    Ambrose Cha MD      
Discharge:  10/15/19    Date of Birth:  [birth year]  
                                                          Report #: [id number]
                                                                    [id number]   
_______________________________________________________________________________
extremity.  Right lower extremity was sterilely prepped and draped in the usual
fashion.  Final timeout was taken to verify correct patient, operative
procedure, operative site, all concurred.  Leg was elevated, but was not
exsanguinated and then tourniquet inflated.  Next, an anterior incision was made
10 cm distal to the knee joint with a 15 cm posterior flap was made.  The skin
was incised.  Dissection was carried down anteriorly through the fascia.  The
muscle was cauterized and the neurovascular bundle was found.  The nerve was cut
sharply and the artery and veins were tied off.  Next, the tibia and fibula were
then cut with an oscillating saw with approximately 30-degree angle dorsally
anteriorly on the tibia to decrease any bony prominence.  Next, the remaining
neurovascular bundles were identified and the nerves were again sharply
dissected and the vasculature was tied off with 2-0 silk ties.  Hemostasis was
achieved.  The flap was then fashioned.  The wound was thoroughly irrigated.  A
deep drain was placed and sutured in place.  The fascia was closed with 0
Vicryl.  The subcutaneous tissue was closed with 2-0 Vicryl and then the skin
was closed with combination of 3-0 nylon and staples.  The wound was dressed
with Adaptic and Xeroform and sterile gauze.  She was placed in a bulky
compressive dressing.  All sponge and needle counts were correct.  The patient
was transferred to postoperative recovery room in stable condition.
 
 
 
 
 
 
 
 
 
 
 
 
 
 
 
 
 
 
 
 
 
 
 
 
 
  <ELECTRONICALLY SIGNED>
   By: Donya Proctor MD      
  10/16/19     1425
D: 09/30/19 1000                           _____________________________________
T: 09/30/19 1121                           Donya Proctor MD        /nt

## 2019-10-28 ENCOUNTER — HOSPITAL ENCOUNTER (INPATIENT)
Dept: HOSPITAL 35 - HYPER | Age: 74
LOS: 5 days | Discharge: NURSING, REHAB OR LONG-TERM CARE | DRG: 500 | End: 2019-11-02
Payer: COMMERCIAL

## 2019-10-28 VITALS — WEIGHT: 101.25 LBS | HEIGHT: 62 IN | BODY MASS INDEX: 18.63 KG/M2

## 2019-10-28 VITALS — DIASTOLIC BLOOD PRESSURE: 63 MMHG | SYSTOLIC BLOOD PRESSURE: 117 MMHG

## 2019-10-28 VITALS — DIASTOLIC BLOOD PRESSURE: 48 MMHG | SYSTOLIC BLOOD PRESSURE: 102 MMHG

## 2019-10-28 DIAGNOSIS — L89.323: ICD-10-CM

## 2019-10-28 DIAGNOSIS — Z87.891: ICD-10-CM

## 2019-10-28 DIAGNOSIS — I95.9: ICD-10-CM

## 2019-10-28 DIAGNOSIS — Z99.2: ICD-10-CM

## 2019-10-28 DIAGNOSIS — E11.22: ICD-10-CM

## 2019-10-28 DIAGNOSIS — E43: ICD-10-CM

## 2019-10-28 DIAGNOSIS — T87.43: Primary | ICD-10-CM

## 2019-10-28 DIAGNOSIS — L03.115: ICD-10-CM

## 2019-10-28 DIAGNOSIS — B96.5: ICD-10-CM

## 2019-10-28 DIAGNOSIS — E11.51: ICD-10-CM

## 2019-10-28 DIAGNOSIS — N18.6: ICD-10-CM

## 2019-10-28 DIAGNOSIS — I48.91: ICD-10-CM

## 2019-10-28 DIAGNOSIS — K50.90: ICD-10-CM

## 2019-10-28 DIAGNOSIS — I12.0: ICD-10-CM

## 2019-10-28 DIAGNOSIS — T87.81: ICD-10-CM

## 2019-10-28 DIAGNOSIS — M62.84: ICD-10-CM

## 2019-10-28 DIAGNOSIS — Z88.5: ICD-10-CM
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
## 2019-10-28 PROCEDURE — 56527: CPT

## 2019-10-28 PROCEDURE — 10047: CPT

## 2019-10-28 PROCEDURE — 57103: CPT

## 2019-10-28 PROCEDURE — 57091: CPT

## 2019-10-28 PROCEDURE — 50010 RENAL EXPLORATION: CPT

## 2019-10-28 PROCEDURE — 62110: CPT

## 2019-10-28 PROCEDURE — 70005: CPT

## 2019-10-28 PROCEDURE — 56525: CPT

## 2019-10-28 PROCEDURE — 50386 REMOVE STENT VIA TRANSURETH: CPT

## 2019-10-28 PROCEDURE — 50101: CPT

## 2019-10-28 PROCEDURE — 62900: CPT

## 2019-10-28 PROCEDURE — 32100 EXPLORATION OF CHEST: CPT

## 2019-10-28 NOTE — NUR
DIRECT ADMISSION FOR NON HEALING RIGHT STUMP. ALERT X4, PAIN MANAGED WITH
MEDICATION. ORTHO AND WOUND CONSULTED. PLACED ON NPO FOR SURGERY WITH ORTHO
TOMOROW. DIALYSIS TODAY REMOVED 1L. ADMISSION HISTORY AND ASSESMENT COMPLETED.
WOUND TO LEFT BUTTOCK CHEEK AND RIGHT STUMP. BILATERAL BKA, WC BOUND. FALL
PRECAUTIONS IN PLACE. CALLS FOR ASSISTANCE

## 2019-10-29 VITALS — SYSTOLIC BLOOD PRESSURE: 108 MMHG | DIASTOLIC BLOOD PRESSURE: 59 MMHG

## 2019-10-29 VITALS — SYSTOLIC BLOOD PRESSURE: 91 MMHG | DIASTOLIC BLOOD PRESSURE: 44 MMHG

## 2019-10-29 VITALS — DIASTOLIC BLOOD PRESSURE: 46 MMHG | SYSTOLIC BLOOD PRESSURE: 95 MMHG

## 2019-10-29 VITALS — SYSTOLIC BLOOD PRESSURE: 96 MMHG | DIASTOLIC BLOOD PRESSURE: 49 MMHG

## 2019-10-29 VITALS — SYSTOLIC BLOOD PRESSURE: 105 MMHG | DIASTOLIC BLOOD PRESSURE: 57 MMHG

## 2019-10-29 LAB
ANION GAP SERPL CALC-SCNC: 10 MMOL/L (ref 7–16)
BUN SERPL-MCNC: 28 MG/DL (ref 7–18)
CALCIUM SERPL-MCNC: 8.9 MG/DL (ref 8.5–10.1)
CHLORIDE SERPL-SCNC: 101 MMOL/L (ref 98–107)
CO2 SERPL-SCNC: 26 MMOL/L (ref 21–32)
CREAT SERPL-MCNC: 3.3 MG/DL (ref 0.6–1)
ERYTHROCYTE [DISTWIDTH] IN BLOOD BY AUTOMATED COUNT: 19.8 % (ref 10.5–14.5)
GLUCOSE SERPL-MCNC: 71 MG/DL (ref 74–106)
HCT VFR BLD CALC: 30.7 % (ref 37–47)
HGB BLD-MCNC: 9.5 GM/DL (ref 12–15)
MCH RBC QN AUTO: 24.7 PG (ref 26–34)
MCHC RBC AUTO-ENTMCNC: 31.1 G/DL (ref 28–37)
MCV RBC: 79.3 FL (ref 80–100)
PLATELET # BLD: 303 THOU/UL (ref 150–400)
POTASSIUM SERPL-SCNC: 3.3 MMOL/L (ref 3.5–5.1)
RBC # BLD AUTO: 3.87 MIL/UL (ref 4.2–5)
SODIUM SERPL-SCNC: 137 MMOL/L (ref 136–145)
WBC # BLD AUTO: 6.7 THOU/UL (ref 4–11)

## 2019-10-29 PROCEDURE — 0KBS0ZZ EXCISION OF RIGHT LOWER LEG MUSCLE, OPEN APPROACH: ICD-10-PCS | Performed by: ORTHOPAEDIC SURGERY

## 2019-10-29 NOTE — NUR
OSTOMY CARE
pouch edges loose, pt unsure how long pouch has been on? changed today using 2
piece carlos cut to fit appliance, stoma pink viable, budded, peristomal
skin intact, loose brown stool noted, cooperative w/ care, supplies placed at
bs, will cont to follow prn

## 2019-10-29 NOTE — NUR
ASSESSMENT COMPLETED. PT A&OX4. PAIN CONTROLLED WITH PAIN MEDS. PT HAS BEEN
NPO AFTER MIDNIGHT FOR POSSIBLE SURGERY TODAY. DRESSING ON RIGHT STUMP AND
LEFT BUTTOCKS CLEAN, DRY AND INTACT. NO S/S DISTRESS. CALL LARIOS WITHIN REACH.
CONT MONITORING

## 2019-10-29 NOTE — NUR
PT A&OX4, VSS, PAIN IN RIGHT STUMP. PATIENT HAS BILAT BKA. WOUND ON RIGHT BKA
AND LEFT BUTTOCK. PATIENT DOWN FOR I&D TO RIGHT BKA. ADMISSION PHOTOS TAKEN.
PAIN MEDICATION GIVEN.  AT BEDSIDE. NO SIGNS OF DISTRESS. DRESSING
CHANGED TO RIGHT BKA AND BUTTOCK AFTER PHOTOS. WILL CONTINUE TO MONITOR.

## 2019-10-30 VITALS — DIASTOLIC BLOOD PRESSURE: 54 MMHG | SYSTOLIC BLOOD PRESSURE: 102 MMHG

## 2019-10-30 VITALS — SYSTOLIC BLOOD PRESSURE: 95 MMHG | DIASTOLIC BLOOD PRESSURE: 50 MMHG

## 2019-10-30 VITALS — DIASTOLIC BLOOD PRESSURE: 39 MMHG | SYSTOLIC BLOOD PRESSURE: 91 MMHG

## 2019-10-30 VITALS — DIASTOLIC BLOOD PRESSURE: 50 MMHG | SYSTOLIC BLOOD PRESSURE: 104 MMHG

## 2019-10-30 VITALS — DIASTOLIC BLOOD PRESSURE: 46 MMHG | SYSTOLIC BLOOD PRESSURE: 97 MMHG

## 2019-10-30 LAB
ANION GAP SERPL CALC-SCNC: 14 MMOL/L (ref 7–16)
BUN SERPL-MCNC: 49 MG/DL (ref 7–18)
CALCIUM SERPL-MCNC: 8.6 MG/DL (ref 8.5–10.1)
CHLORIDE SERPL-SCNC: 100 MMOL/L (ref 98–107)
CO2 SERPL-SCNC: 21 MMOL/L (ref 21–32)
CREAT SERPL-MCNC: 4.9 MG/DL (ref 0.6–1)
ERYTHROCYTE [DISTWIDTH] IN BLOOD BY AUTOMATED COUNT: 19.6 % (ref 10.5–14.5)
GLUCOSE SERPL-MCNC: 105 MG/DL (ref 74–106)
HCT VFR BLD CALC: 31.4 % (ref 37–47)
HGB BLD-MCNC: 9.7 GM/DL (ref 12–15)
MCH RBC QN AUTO: 24.6 PG (ref 26–34)
MCHC RBC AUTO-ENTMCNC: 30.9 G/DL (ref 28–37)
MCV RBC: 79.7 FL (ref 80–100)
PLATELET # BLD: 317 THOU/UL (ref 150–400)
POTASSIUM SERPL-SCNC: 4.1 MMOL/L (ref 3.5–5.1)
RBC # BLD AUTO: 3.94 MIL/UL (ref 4.2–5)
SODIUM SERPL-SCNC: 135 MMOL/L (ref 136–145)
WBC # BLD AUTO: 5.3 THOU/UL (ref 4–11)

## 2019-10-30 NOTE — HC
[facility]
[address]
[place]   [address]                     CONSULTATION                  
_______________________________________________________________________________
 
Name:       GAVINO NGUYEN                   Room #:         454-P       ADM IN  
M.R.#:      [id number]                       Account #:      [id number]  
Admission:  10/28/19    Attend Phys:    Rg Estrada MD
Discharge:              Date of Birth:  [birth year]  
                                                          Report #: [id number]
                                                                    [id number]   
_______________________________________________________________________________
THIS REPORT FOR:   //name//                          
 
CC: Jame Estrada
 
DATE OF SERVICE:  10/29/2019
 
 
CHIEF COMPLAINT:  Compromised right BKA incision line.  
 
HISTORY OF PRESENT ILLNESS:  This is a 74-year-old female patient with history
of diabetes mellitus, peripheral vascular disease as well as end-stage renal
disease, who I am very familiar from recent hospitalization.  She had
significant toe infection and required below-knee amputation on 09/29.  She had
done well post-discharge, but now has presented to the wound clinic with
increasing pain, drainage and tissue loss involving the incision line.  It
appeared to be infected.  It is very tender and it was felt that this would not
be amenable to a bedside debridement.  She has been admitted to the hospital for
intravenous antibiotic therapy as well as a surgical consultation for
debridement and revision of the BKA stump.
 
PAST MEDICAL HISTORY:  Once again is positive for history of diabetes mellitus,
severe peripheral vascular disease, end-stage renal disease, requiring
hemodialysis, severe protein-calorie malnutrition, stage 3 pressure ulceration
of the left buttock and a blister to her right knee.
 
ALLERGIES:  CODEINE.
 
MEDICATIONS:  Amiodarone, Sensipar, vitamin B12, fludrocortisone, gabapentin,
hydrocodone, midodrine, Xarelto, vancomycin.
 
SOCIAL HISTORY:  Negative for alcohol or tobacco use.  She is ,
accompanied by her .
 
REVIEW OF SYSTEMS:
CONSTITUTIONAL:  The patient denies fever, chills, or weight loss.
NEUROLOGICAL:  The patient denies focal weakness, numbness or tingling.
EYES:  The patient denies visual changes, redness, or drainage.
ENT:  The patient denies earache, nasal drainage or sore throat.
CARDIOVASCULAR:  The patient denies chest pain, palpitations or diaphoresis.
PULMONARY:  The patient denies cough or shortness of breath.
GASTROINTESTINAL:  The patient denies nausea, vomiting, diarrhea or abdominal
pain.
ORTHOPEDIC:  The patient complains of significant pain involving the right BKA
area.
 
 
 
[address]   [address]                     CONSULTATION                  
_______________________________________________________________________________
 
Name:       GAVINO NGUYEN                   Room #:         454-P       [place] [facility] [place]  
M.R.#:      [id number]                       Account #:      [id number]  
Admission:  10/28/19    Attend Phys:    Rg Estrada MD
Discharge:              Date of Birth:  [birth year]  
                                                          Report #: [id number]
                                                                    [id number]   
_______________________________________________________________________________
Other systems in a 14-point review of systems are negative.
 
PHYSICAL EXAMINATION:
VITAL SIGNS:  At this time include temperature 37.1, pulse 70, respiratory rate
18, blood pressure 108/59.
GENERAL:  This is a chronically ill-appearing female patient appears to be in
minimal distress.
HEENT:  Head normocephalic.  Nose and throat are clear.
NECK:  Supple.
LUNGS:  Clear.
HEART:  Regular rhythm.
ABDOMEN:  Soft.  Bowel sounds present.
EXTREMITIES:  Lower extremities demonstrate left BKA site is well-healed, right
side demonstrates necrosis of the lateral posterior portion of the flap closure,
sutures and staples do remain in place.  It is very tender to palpation.  There
is mild warmth and redness noted as well.
 
CLINICAL IMPRESSION:
1.  Wound infection with tissue loss involving the right below-knee amputation
stump.
2.  End-stage renal disease, requiring hemodialysis.
3.  Peripheral vascular disease.
4.  Status post colectomy for Crohn's disease.
 
RECOMMENDATIONS:  At this point in time, the patient will be started on
intravenous antibiotic therapy.  Continue current medications.  We will do
hemodialysis.  We have consulted Orthopedics and the patient is to go to the
operating room today for debridement/revision of the BKA stump.  The patient and
 are agreeable to current plan of care.  I appreciate being asked to see
her in consultation.
 
 
 
 
 
 
 
 
 
 
 
 
 
 
  <ELECTRONICALLY SIGNED>
   By: Jame Gunderson MD        
  10/30/19     0824
D: 10/29/19 2013                           _____________________________________
T: 10/30/19 0648                           Jame Gunderson MD          /nt

## 2019-10-30 NOTE — NUR
PT ARRIVED FROM PACU AT 1950HRS. PT IS AOX4 AND LETS NEEDS BE KNOWN. FALL
PRECAUTION IN PLACE. I&D COMPLETED. PT WAS PLACED ON 5L O2 VIA NC FROM PACU.
PT REPORTED SOME PAIN AND WAS TREATED WT PRN PAIN MEDS. DIALYSIS SCHEDULED IN
THE AM. PT IS ANURIC. VSS AND NO S/S OF ACUTE DISTRESS. WILL CONTINUE TO
MONITOR.

## 2019-10-30 NOTE — NUR
DISCHARGE PLANNING.  ANTICIPATED DISCHARGE IN ONE TO TWO DAYS.  POST ACUTE
RECOMMENDED AT DISCHARGE.  PATIENT REFERRAL FAXED TO [facility] PER REQUEST. CALL RECEIVED FROM L[facility], [facility] ADMISSIONS.  [facility]
STATES RECEIVED PATIENT REFERRAL AND WILL BEGIN INSURANCE AUTH PROCESS.  PAT
TO NOTIFY CM ONCE INSURANCE AUTH OBTAINED. UNIT SW NOTIFIED.  FOLLOWING.

## 2019-10-30 NOTE — NUR
PT ADMITTED RELATED TO WCOV, R BKA INFECTION. CM MET WITH PT AT BEDSIDE THIS
DAY. PT IS A&O X4. CM ROLE INTRODUCED. PT INDICATED SHE HAD BEEN SKILLED OVER
AT [facility] PTA. PT INDICATED SHE HAD BEEN USING A WC AND A
SLIDEBOARD TO ASSIST WITH MOBILITY PTA. PT INDICATED SHE HOPES TO RETURN TO
[facility] UPON DC TO RESUME SKILLED THERAPY SERVICES. CM CALLED AND SPOKE WITH
PT'S SPOUSE AND HE INDICATED THAT THAT IS HIS HOPE AS WELL. CM TO FOLLOW AS
INDICATED WITH DC PLANNING. REFERRAL SENT TO [facility] THEY WILL NEED AUTH FOR
READMSSION.

## 2019-10-30 NOTE — NUR
PT A&Ox4, VSS, GENERALIZED PAIN. PATIENT RECEIVED DIALYSIS, 2 L OFF. NO SIGNS
OPF DISTRESS, PATIENT DENIES SOA. DRESSING TO RIGHT STUMP REMAINS C/D/I. WILL
CONTINUE TO MONITOR.

## 2019-10-31 VITALS — DIASTOLIC BLOOD PRESSURE: 49 MMHG | SYSTOLIC BLOOD PRESSURE: 100 MMHG

## 2019-10-31 VITALS — DIASTOLIC BLOOD PRESSURE: 48 MMHG | SYSTOLIC BLOOD PRESSURE: 104 MMHG

## 2019-10-31 NOTE — NUR
Patient making progress towards outcome goals. Vitalsigns stable. Denies need
for pain medication. Dressing dry and intact.

## 2019-10-31 NOTE — HC
[facility]
[address]
[place]   [address]                     CONSULTATION                  
_______________________________________________________________________________
 
Name:       GAVINO NGUYEN                   Room #:         454-P       ADM [place]  
M.R.#:      [id number]                       Account #:      [id number]  
Admission:  10/28/19    Attend Phys:    Rg Estrada MD
Discharge:              Date of Birth:  [birth year]  
                                                          Report #: [id number]
                                                                    [id number]   
_______________________________________________________________________________
THIS REPORT FOR:   //name//                          
 
CC: Jame Estrada
 
DATE OF SERVICE:  10/30/2019
 
 
INFECTIOUS DISEASE CONSULTATION
 
REASON FOR CONSULTATION:  I was asked to evaluate concerning surgical site
infection, right BKA.
 
HISTORY OF PRESENT ILLNESS:  The 74-year-old known from her previous
hospitalization where she had significant peripheral vascular disease,
nonhealing ulcer to the lower extremity with gangrene and severe rest pain,
unable to be bypassed sufficiently to heal her foot and underwent BKA. 
Subsequently, has had incisional dehiscence and infection, presented now due to
increased pain and drainage.  Underwent revision surgery yesterday without
complication.  Continues to have pain.  Awaiting cultures.
 
REVIEW OF SYSTEMS:  No fever, chills, or sweats.  No cough or sputum production.
 No nausea, vomiting, or diarrhea.  She is with end-stage renal disease, on
dialysis via right chest tunneled dialysis catheter.
 
ALLERGIES:  CODEINE.
 
MEDICATIONS:  As noted on her MAR including vancomycin, amiodarone, Florinef,
Neurontin, midodrine, Xarelto, Sensipar.
 
PAST MEDICAL HISTORY, FAMILY HISTORY, AND SOCIAL HISTORY:  Unchanged from her
previous consultation.  Noted that she does have Crohn's disease, status post
colectomy and colostomy.
 
Review of systems as noted above with no additions.
 
PHYSICAL EXAMINATION:
VITAL SIGNS:  Afebrile, hemodynamically stable.
GENERAL:  Alert and cooperative, lying in bed, in discomfort involving her right
BKA stump.
HEENT:  Unremarkable.
CHEST:  Clear.  Right chest dialysis catheter tunneled without erythema or
drainage.
HEART:  Regular, without murmur.
ABDOMEN:  Soft and nontender.
 
 
 
[facility]
[address] [place]   [address]                     CONSULTATION                  
_______________________________________________________________________________
 
Name:       GAVINO NGUYEN                   Room #:         [address] [place] [address] [place]  
..#:      [id number]                       Account #:      [id number]  
Admission:  10/28/19    Attend Phys:    Rg Estrada MD
Discharge:              Date of Birth:  [birth year]  
                                                          Report #: [id number]
                                                                    [id number]   
_______________________________________________________________________________
EXTREMITIES:  Right BKA stump was dressed and dry.
 
LABORATORY STUDIES:  Hemoglobin 9.7, WBC 5.3.  Gram stain of the operative
tissue, no organisms seen.
 
IMPRESSION:
1.  Underlying peripheral vascular disease with nonhealing right below-knee
amputation stump with secondary infection.
2.  Peripheral vascular disease.
3.  Previous colectomy for Crohn's disease.  No immunosuppression at this point.
4.  Stage 3 left buttock pressure wound.
5.  Hypertension.
 
RECOMMENDATIONS:  Continue IV antibiotic therapy pending culture results.  I am
concerned that she may not have adequate blood flow to heal this BKA revision. 
If she continues to have rest pain and inability to heal, we would consider AKA.
 We will add gram-negative coverage pending further cultures.
 
 
 
 
 
 
 
 
 
 
 
 
 
 
 
 
 
 
 
 
 
 
 
 
 
 
 
  <ELECTRONICALLY SIGNED>
   By: Dylan Chi MD            
  10/31/19     1901
D: 10/30/19 2327                           _____________________________________
T: 10/31/19 1800                           Dylan Chi MD              /nt

## 2019-10-31 NOTE — NUR
QUIET UNEVENTFUL DAY. PATIENT HAVING CONSTANT PAIN DESPITE OXYCODONE Q4H AND
FENTANYL/  STARTED OXY ER LATE MORNING. BY LATER THIS AFTERNOON HAVING
BETTER RELIEF OF PAIN. STATED FENTANYL DID NOT HELP AT ALL. DID NOT EAT MUCH
BREAKFAST OR LUNCH.  [facility]
SUPPER. ON BEDREST. ABLE TO SIT ON SIDE OF BED WITH ASSISTANCE. PHYSICIAN AND
WOUND CARE CHANGED RIGHT STUMP DRESSING. AFEBRILE. IV ANTIBIOTIC CONTINUED.
FALL PRECAUTIONS IN PLACE.

## 2019-10-31 NOTE — NUR
FAXED TODAY'S PT/OT NOTES TO [facility] RECEIVED
CONFIRMATION AND LEFT MSG WITH LARS IN ADM. DP TO FOLLOW.

## 2019-11-01 VITALS — SYSTOLIC BLOOD PRESSURE: 94 MMHG | DIASTOLIC BLOOD PRESSURE: 52 MMHG

## 2019-11-01 VITALS — SYSTOLIC BLOOD PRESSURE: 100 MMHG | DIASTOLIC BLOOD PRESSURE: 58 MMHG

## 2019-11-01 PROCEDURE — 5A1D70Z PERFORMANCE OF URINARY FILTRATION, INTERMITTENT, LESS THAN 6 HOURS PER DAY: ICD-10-PCS

## 2019-11-01 NOTE — NUR
PROGRESS
 
PT A/O X3 RATES PAIN TO STUMP A 7 TO 8 TAKING OXYCODONE WITH EFFECT PT SLEEPS
AFTER. TESSIO CATHETER INTACT. VSS, PT SLEPT ON AND OFF THROUGHOUT NIGHT. TO
HAVE DIALYSIS TODAY. CONTINUE POC.

## 2019-11-01 NOTE — NUR
CARE TEAM INDICATED THAT PT IS MEDICALLY STABLE TO DC BACK TO [facility] REH[facility] THIS DAY AUTH HAS BEEN RECIEVED. PT WILL FINISH WITH DIALYSIS AT
1600 AND FACILITY WOULD PREFER TO ADMIT HER TOMORROW INSTEAD OF LATER THIS
EVENING. PHYSICIAN APPROVED. TRASPORT VIA SECURE SET FOR 10:00AM TOMORROW
SATURDAY 11/02/19. CM NOTIFIED PT'S SPOUSE, HE IS AWARE AND AGREEABLE. REPORT
TO BE CALLED TO [phone] ASK FOR GONZÁLEZ. FAX ORDERS TO [phone]. NO
OTHER CM INTERVENTION INDICATED. CASE CLOSED.

## 2019-11-01 NOTE — NUR
PT A&OX4, VSS, PAIN  IN RIGHT STUMP AND BUTTOCKS. PATIENT COMPLETED DIALYSIS,
1 L OFF. COLOSTOMY CARE GIVEN, BAG CHANGED. FAMILY AT BEDSIDE. NO SIGNS OF
DISTRESS. WILL CONTINUE TO MONITOR.

## 2019-11-02 VITALS — SYSTOLIC BLOOD PRESSURE: 111 MMHG | DIASTOLIC BLOOD PRESSURE: 56 MMHG

## 2019-11-02 NOTE — NUR
TRANSPORT HERE AT 0950 TO PROVIDE TRANSPORATION TO [facility] SKILLED.
REPORT CALLED TO JACKIE AT [facility].  TRANSFERED TO W/C WITH MAX ASSIST.  VERY
PLESANT AND COOPERATIVE.  AA0X4.  GOOD APPETITE FOR BREAKFAST.  NO C/O PAIN.
DISCHARGED PER DR. LINDQUIST

## 2019-11-02 NOTE — NUR
PROGRESS
 
PT A/O X4 REPORTS PAIN TO BUTTOCKS BACK AND RIGHT STUMP TAKING OXYCODONE ER
AND PERCOCET AND FENTANYL PRN WITH SOME EFFECT PT REFUSES ACCUCHECKS. IV
ANTIBIOTICS CONTINUE AS ORDERED. TOLERATING DIET, COLOSTOMY WITH GOOD OUTPUT
CONTINUE POC.

## 2021-02-09 NOTE — O
[facility]
[address]
[place]   [address]                     OPERATIVE REPORT              
_______________________________________________________________________________
 
Name:       GAVINO NGUYEN                   Room #:         454-P       ADM [place]  
M.R.#:      [id number]                       Account #:      [id number]  
Admission:  10/28/19    Attend Phys:    Rg Estrada MD
Discharge:              Date of Birth:  [birth year]  
                                                          Report #: [id number]
                                                                    [id number]   
_______________________________________________________________________________
THIS REPORT FOR:   //name//                          
 
CC: Jame Estrada
 
DATE OF SERVICE:  10/29/2019
 
 
SERVICE:  Orthopedics.
 
FACILITY:  [facility].
 
SURGEON:  Louis Daniel MD
 
ASSISTANT:  Viola Guadalupe NP.
 
PREOPERATIVE DIAGNOSES:
1.  Nonhealing wound, right leg.
2.  Status post prior below knee amputation.
3.  End-stage renal disease, on hemodialysis.
4.  Sarcopenia.
 
POSTOPERATIVE DIAGNOSES:
1.  Nonhealing wound, right leg.
2.  Status post prior below knee amputation.
3.  End-stage renal disease, on hemodialysis.
4.  Sarcopenia.
 
PROCEDURES:  Irrigation and debridement down to muscle layer, right leg wound
(excisional).
 
COMPLICATIONS:  None.
 
DRAINS:  None.
 
SPECIMENS:  Deep tissue for culture.
 
FINDINGS:
1.  No significant purulence.  No evidence of necrosis or deep space infection.
2.  Successful tension free repair.
 
HISTORY:  The patient is a 74-year-old female with history of multiple
significant medical problems.  She is status post below-knee amputation, had a
wound closure and was having difficulty with the wound healing.  She had eschar
on the lateral aspect of the wound and the wound care team was unable to get
 
 
 
[facility]
[address]
[place]   [address]                     OPERATIVE REPORT              
_______________________________________________________________________________
 
Name:       GAVINO NGUYEN                   Room #:         454-P       ADM [place]  
M.R.#:      [id number]                       Account #:      [id number]  
Admission:  10/28/19    Attend Phys:    Rg Estrada MD
Discharge:              Date of Birth:  [birth year]  
                                                          Report #: [id number]
                                                                    [id number]   
_______________________________________________________________________________
this to heal and they were also having some difficulty because she has
significant pain with wound treatments including simply removing sutures and
staples and so it was recommended that she undergo surgical debridement for a
more thorough excision and wound treatment.  Risks, benefits, alternatives and
indication of surgery discussed with her and her  preoperatively, risks
up to and including mortality and morbidity with repeat surgery.
 
PROCEDURE IN DETAIL:  After the right leg was correctly identified in
preoperative holding as operative extremity, the patient was taken to the
operating room where general anesthesia was induced without complication. 
Prophylactic antibiotics were not administered intraoperatively.  She is on a
scheduled regimen currently and this was monitored.  Right leg was prepped and
draped in standard sterile fashion.  No tourniquet was utilized.  Our time-out
procedure was performed.
 
The wound was opened.  There was eschar on the lateral half of the wound.  The
medial half of the wound was healed, other than two areas of suture abscess and
this was debrided.  On the lateral side, I excised the eschar sharply, debrided
the muscle and fat layer with the rongeur.  There was only a limited amount of
superficially necrotic tissue, deep, everything was clean.  There was no
purulence.  I did take a deep specimen of tissue for culture at this point, then
the wound was copiously irrigated.  Repeated a debridement with a curette and
then irrigated once more.  At this point, I used 2-0 PDS to perform a
multilayered closure, which provided good approximation of the skin edges and
then 3-0 nylon with diagonal mattress suture used to close the skin in a
tension-free repair.  The Xeroform was then applied to the wounds, followed by a
well-padded dressing.  The patient was awakened from anesthesia and taken to
recovery room in stable condition.  No complications.  All counts were correct.
 
 
 
 
 
 
 
 
 
 
 
 
 
 
 
 
  <ELECTRONICALLY SIGNED>
   By: Louis Daniel MD         
  10/30/19     0918
D: 10/29/19 1909                           _____________________________________
T: 10/29/19 1925                           Louis Daniel MD           /nt Let's order home wound care for his legs. I don't want him getting cellulitis again   DISPLAY PLAN FREE TEXT

## 2023-09-23 NOTE — NUR
PT ADMITTED RELATED TO WOUND. CM REVIEWED CHART AND SPOKE WITH CARE TEAM. CM
ATTEMPTED TO MEET WITH PT AT BEDSIDE THIS DAY BUT PT WAS SLEEPING. CM CALLED
PT'S SPOUSE AND HE INDICATED THAT THEY LIVE IN A HOUSE WITH 2 STEPS TO ENTER
AND NO STEPS PT USES INSIDE. SPOUSE INDICATED THAT PT HAD BEEN USING A
WHEELCHAIR, FWW, AND PROSTEHSIS TO ASSIST WITH MOBILITY PTA. HE INDICATED THAT
PT HAD BEEN ON SERVICE WITH [facility] PTA. HE INDICATED THAT HE
ANTICPATES PT RETURNING HOME WITH [facility] ONCE MEDICALLY STABLE. CM TO
FOLLOW AS INDICATED WITH DC PLANNING. Not applicable

## 2024-01-28 NOTE — NUR
Assess due to RD consult received for pt with wounds.  Familiar with pt from
recent prior extended admit.  Hx old left BKA but more recent right BKA with
severe PVD and buttock wound-wound care has been consulted.  Pt also has hx
protein calorie malnutrition.  Had lost signficiant wt, but now appears to be
regaining with new wt 101 lb.  Was eating well throughout recent admit and
likes to drink Nepro.  NPO for possible procedure.  Will order Nepro twice
daily when diet advanced.  Would consider low nutrition risk at this time. No
